# Patient Record
Sex: FEMALE | Race: BLACK OR AFRICAN AMERICAN | NOT HISPANIC OR LATINO | Employment: FULL TIME | URBAN - METROPOLITAN AREA
[De-identification: names, ages, dates, MRNs, and addresses within clinical notes are randomized per-mention and may not be internally consistent; named-entity substitution may affect disease eponyms.]

---

## 2018-05-16 ENCOUNTER — HOSPITAL ENCOUNTER (EMERGENCY)
Facility: HOSPITAL | Age: 18
Discharge: HOME | End: 2018-05-16
Attending: EMERGENCY MEDICINE
Payer: MEDICAID

## 2018-05-16 VITALS
DIASTOLIC BLOOD PRESSURE: 91 MMHG | SYSTOLIC BLOOD PRESSURE: 135 MMHG | TEMPERATURE: 98 F | BODY MASS INDEX: 41.81 KG/M2 | WEIGHT: 236 LBS | OXYGEN SATURATION: 99 % | RESPIRATION RATE: 16 BRPM | HEART RATE: 80 BPM

## 2018-05-16 DIAGNOSIS — N89.8 VAGINAL DISCHARGE: ICD-10-CM

## 2018-05-16 DIAGNOSIS — R10.2 PELVIC PAIN: Primary | ICD-10-CM

## 2018-05-16 LAB
B-HCG UR QL: NEGATIVE
BILIRUBIN, POC UA: NEGATIVE
BLOOD, POC UA: ABNORMAL
CLARITY, POC UA: CLEAR
COLOR, POC UA: YELLOW
CTP QC/QA: YES
GLUCOSE, POC UA: NEGATIVE
KETONES, POC UA: NEGATIVE
LEUKOCYTE EST, POC UA: ABNORMAL
NITRITE, POC UA: NEGATIVE
PH UR STRIP: 5.5 [PH]
PROTEIN, POC UA: NEGATIVE
SPECIFIC GRAVITY, POC UA: >=1.03
UROBILINOGEN, POC UA: 0.2 E.U./DL

## 2018-05-16 PROCEDURE — 81003 URINALYSIS AUTO W/O SCOPE: CPT

## 2018-05-16 PROCEDURE — 87088 URINE BACTERIA CULTURE: CPT

## 2018-05-16 PROCEDURE — 87510 GARDNER VAG DNA DIR PROBE: CPT

## 2018-05-16 PROCEDURE — 87086 URINE CULTURE/COLONY COUNT: CPT

## 2018-05-16 PROCEDURE — 99284 EMERGENCY DEPT VISIT MOD MDM: CPT | Mod: 25

## 2018-05-16 PROCEDURE — 81025 URINE PREGNANCY TEST: CPT | Performed by: EMERGENCY MEDICINE

## 2018-05-16 PROCEDURE — 87480 CANDIDA DNA DIR PROBE: CPT

## 2018-05-16 PROCEDURE — 87147 CULTURE TYPE IMMUNOLOGIC: CPT

## 2018-05-16 PROCEDURE — 25000003 PHARM REV CODE 250: Performed by: NURSE PRACTITIONER

## 2018-05-16 PROCEDURE — 96372 THER/PROPH/DIAG INJ SC/IM: CPT

## 2018-05-16 PROCEDURE — 63600175 PHARM REV CODE 636 W HCPCS: Performed by: NURSE PRACTITIONER

## 2018-05-16 PROCEDURE — 87491 CHLMYD TRACH DNA AMP PROBE: CPT

## 2018-05-16 RX ORDER — AZITHROMYCIN 250 MG/1
1000 TABLET, FILM COATED ORAL
Status: COMPLETED | OUTPATIENT
Start: 2018-05-16 | End: 2018-05-16

## 2018-05-16 RX ORDER — CEFTRIAXONE 250 MG/1
250 INJECTION, POWDER, FOR SOLUTION INTRAMUSCULAR; INTRAVENOUS
Status: COMPLETED | OUTPATIENT
Start: 2018-05-16 | End: 2018-05-16

## 2018-05-16 RX ORDER — METRONIDAZOLE 500 MG/1
500 TABLET ORAL EVERY 12 HOURS
Qty: 21 TABLET | Refills: 0 | Status: SHIPPED | OUTPATIENT
Start: 2018-05-16 | End: 2018-05-23

## 2018-05-16 RX ADMIN — AZITHROMYCIN 1000 MG: 250 TABLET, FILM COATED ORAL at 12:05

## 2018-05-16 RX ADMIN — CEFTRIAXONE SODIUM 250 MG: 250 INJECTION, POWDER, FOR SOLUTION INTRAMUSCULAR; INTRAVENOUS at 12:05

## 2018-05-16 NOTE — ED PROVIDER NOTES
"Encounter Date: 5/16/2018       History     Chief Complaint   Patient presents with    Abdominal Pain     Pt states," My stomach has been hurting for two days and I have a headache."     The history is provided by the patient. No  was used.   Female  Problem   Primary symptoms include discharge, pelvic pain.    Primary symptoms include no fever, no dyspareunia, no genital lesions, no genital pain, no genital rash, no genital itching, no genital odor, no dysuria, and no vaginal bleeding.   This is a new problem. The current episode started several weeks ago. The problem occurs constantly. The problem has been waxing and waning. The symptoms occur spontaneously. The discharge was white. Pertinent negatives include no anorexia, no diaphoresis, no abdominal swelling, no constipation, no diarrhea, no nausea, no vomiting, no frequency, no light-headedness and no dizziness. She has tried nothing for the symptoms. Sexual activity: sexually active. There is a concern regarding sexually transmitted diseases. She uses nothing for contraception.     Review of patient's allergies indicates:  No Known Allergies  Past Medical History:   Diagnosis Date    Obesity      History reviewed. No pertinent surgical history.  Family History   Problem Relation Age of Onset    Cancer Neg Hx      Social History   Substance Use Topics    Smoking status: Never Smoker    Smokeless tobacco: Never Used    Alcohol use No     Review of Systems   Constitutional: Negative.  Negative for appetite change, diaphoresis and fever.   HENT: Negative.  Negative for congestion, dental problem, ear discharge, hearing loss, mouth sores, rhinorrhea and trouble swallowing.    Eyes: Negative.  Negative for pain and discharge.   Respiratory: Negative.  Negative for shortness of breath.    Cardiovascular: Negative.  Negative for chest pain.   Gastrointestinal: Negative for abdominal distention, anorexia, constipation, diarrhea, nausea, " rectal pain and vomiting.   Endocrine: Negative.    Genitourinary: Positive for pelvic pain and vaginal discharge. Negative for dyspareunia, dysuria, frequency, hematuria, vaginal bleeding and vaginal pain.   Musculoskeletal: Negative for back pain and neck pain.   Skin: Negative.  Negative for rash.   Allergic/Immunologic: Negative.    Neurological: Negative.  Negative for dizziness, facial asymmetry, speech difficulty and light-headedness.   Hematological: Negative.    Psychiatric/Behavioral: Negative.  Negative for agitation, dysphoric mood and sleep disturbance.   All other systems reviewed and are negative.      Physical Exam     Initial Vitals [05/16/18 1123]   BP Pulse Resp Temp SpO2   (!) 135/91 80 16 98 °F (36.7 °C) 99 %      MAP       105.67         Physical Exam    Nursing note and vitals reviewed.  Constitutional: She appears well-developed and well-nourished. She is not diaphoretic.  Non-toxic appearance. She does not appear ill. No distress.   HENT:   Head: Normocephalic and atraumatic.   Eyes: Conjunctivae are normal. Right eye exhibits no discharge. Left eye exhibits no discharge.   Neck: Normal range of motion.   Cardiovascular: Normal rate, regular rhythm, normal heart sounds and intact distal pulses. Exam reveals no gallop and no friction rub.    No murmur heard.  Pulmonary/Chest: Breath sounds normal. No respiratory distress. She has no wheezes. She has no rhonchi. She has no rales. She exhibits no tenderness.   Abdominal: Soft. Bowel sounds are normal. She exhibits no distension and no mass. There is no tenderness. There is no rebound and no guarding.   Genitourinary: Uterus normal. Pelvic exam was performed with patient supine. There is no rash, tenderness, lesion or injury on the right labia. There is no rash, tenderness, lesion or injury on the left labia. Cervix exhibits discharge. Cervix exhibits no motion tenderness and no friability. Right adnexum displays no mass and no tenderness. Left  adnexum displays no mass and no tenderness. No erythema, tenderness or bleeding in the vagina. No foreign body in the vagina. No signs of injury around the vagina. Vaginal discharge (mucous-like, yellowish-white) found.   Musculoskeletal: Normal range of motion.   Neurological: She is alert and oriented to person, place, and time.   Skin: Skin is warm and dry. Capillary refill takes less than 2 seconds. No rash noted.   Psychiatric: She has a normal mood and affect. Her behavior is normal. Judgment and thought content normal.         ED Course   Procedures  Labs Reviewed   POCT URINALYSIS W/O SCOPE - Abnormal; Notable for the following:        Result Value    Glucose, UA Negative (*)     Bilirubin, UA Negative (*)     Ketones, UA Negative (*)     Spec Grav UA >=1.030 (*)     Blood, UA Trace-lysed (*)     Protein, UA Negative (*)     Nitrite, UA Negative (*)     Leukocytes, UA 1+ (*)     All other components within normal limits   C. TRACHOMATIS/N. GONORRHOEAE BY AMP DNA   VAGINOSIS SCREEN BY DNA PROBE   CULTURE, URINE   POCT URINE PREGNANCY   POCT URINALYSIS W/O SCOPE             Medical Decision Making:   Initial Assessment:   Vaginal discharge, pelvic pain  Differential Diagnosis:   STD, adnexal tenderness  Clinical Tests:   Lab Tests: Ordered and Reviewed  The following lab test(s) were unremarkable: Urinalysis       <> Summary of Lab: GC culture and urine culture and vaginal screen pending  ED Management:  Rocephin IM and azithromycin p.o. given in the ER patient discharged home on Flagyl.  Will call patient with results of cultures.  Patient is instructed to refrain from sex x 1 week and follow up with her primary care provider/OBGYN tomorrow.  Patient is instructed also to use condoms when having sex and return to the ER as needed if symptoms worsen or fail to improve.  Patient verbalized understanding of discharge instructions and treatment plan.              Attending Attestation:     Physician Attestation  Statement for NP/PA:   I reviewed the chart but I did not personally examine the patient. The face to face encounter was performed by the NP/PA.                     Clinical Impression:   The primary encounter diagnosis was Pelvic pain. A diagnosis of Vaginal discharge was also pertinent to this visit.                           Toussaint Battley III, MARIA GUADALUPE  05/16/18 7112       Lizbet Colunga MD  05/16/18 3185

## 2018-05-17 LAB
CANDIDA RRNA VAG QL PROBE: NEGATIVE
G VAGINALIS RRNA GENITAL QL PROBE: NEGATIVE
T VAGINALIS RRNA GENITAL QL PROBE: NEGATIVE

## 2018-05-18 LAB
BACTERIA UR CULT: NORMAL
C TRACH DNA SPEC QL NAA+PROBE: NOT DETECTED
N GONORRHOEA DNA SPEC QL NAA+PROBE: NOT DETECTED

## 2018-10-05 ENCOUNTER — HOSPITAL ENCOUNTER (EMERGENCY)
Facility: HOSPITAL | Age: 18
Discharge: HOME | End: 2018-10-06
Attending: EMERGENCY MEDICINE
Payer: MEDICAID

## 2018-10-05 VITALS
WEIGHT: 246 LBS | TEMPERATURE: 99 F | RESPIRATION RATE: 20 BRPM | SYSTOLIC BLOOD PRESSURE: 125 MMHG | OXYGEN SATURATION: 100 % | DIASTOLIC BLOOD PRESSURE: 88 MMHG | HEART RATE: 82 BPM | BODY MASS INDEX: 43.59 KG/M2 | HEIGHT: 63 IN

## 2018-10-05 DIAGNOSIS — N94.6 DYSMENORRHEA: Primary | ICD-10-CM

## 2018-10-05 DIAGNOSIS — N93.8 DYSFUNCTIONAL UTERINE BLEEDING: ICD-10-CM

## 2018-10-05 LAB
B-HCG UR QL: NEGATIVE
BILIRUBIN, POC UA: NEGATIVE
BLOOD, POC UA: ABNORMAL
CLARITY, POC UA: CLEAR
COLOR, POC UA: YELLOW
CTP QC/QA: YES
GLUCOSE, POC UA: NEGATIVE
KETONES, POC UA: NEGATIVE
LEUKOCYTE EST, POC UA: ABNORMAL
NITRITE, POC UA: NEGATIVE
PH UR STRIP: 6 [PH]
PROTEIN, POC UA: ABNORMAL
SPECIFIC GRAVITY, POC UA: 1.02
UROBILINOGEN, POC UA: 0.2 E.U./DL

## 2018-10-05 PROCEDURE — 81025 URINE PREGNANCY TEST: CPT | Performed by: NURSE PRACTITIONER

## 2018-10-05 PROCEDURE — 81003 URINALYSIS AUTO W/O SCOPE: CPT

## 2018-10-05 PROCEDURE — 87491 CHLMYD TRACH DNA AMP PROBE: CPT

## 2018-10-05 PROCEDURE — 99283 EMERGENCY DEPT VISIT LOW MDM: CPT | Mod: 25

## 2018-10-05 PROCEDURE — 87660 TRICHOMONAS VAGIN DIR PROBE: CPT

## 2018-10-05 RX ORDER — IBUPROFEN 600 MG/1
600 TABLET ORAL EVERY 6 HOURS PRN
Qty: 20 TABLET | Refills: 0 | Status: SHIPPED | OUTPATIENT
Start: 2018-10-05 | End: 2019-12-07 | Stop reason: SDUPTHER

## 2018-10-06 NOTE — ED PROVIDER NOTES
Encounter Date: 10/5/2018       History     Chief Complaint   Patient presents with    Pelvic Pain     Patient stated being on menstrual cycle with blood clots the size of cotton balls x 3 days. Patient also stated starting birth control x 1 month. Patient stated her menses started on August 7, 2018. Patient stated seeing her GYN physician on september 05, 2018. Patient no improvment .     The history is provided by the patient. No  was used.   Vaginal Bleeding   This is a recurrent problem. The current episode started more than 1 week ago. Episode frequency: Intermittently. Progression since onset: Worsening over the past 3 days. Associated symptoms include abdominal pain (Pelvic pain). Pertinent negatives include no chest pain, no headaches and no shortness of breath. Nothing aggravates the symptoms. Nothing relieves the symptoms. Treatments tried: Patient has seen her OBGYN for recurrent irregular menses and is currently being treated with oral contraceptives.  Patient denies having any sex since July of 2018.     Review of patient's allergies indicates:  No Known Allergies  Past Medical History:   Diagnosis Date    Obesity      History reviewed. No pertinent surgical history.  Family History   Problem Relation Age of Onset    Cancer Neg Hx      Social History     Tobacco Use    Smoking status: Never Smoker    Smokeless tobacco: Never Used   Substance Use Topics    Alcohol use: Not on file    Drug use: Yes     Frequency: 1.0 times per week     Types: Marijuana     Review of Systems   Constitutional: Negative.  Negative for appetite change and fever.   HENT: Negative.  Negative for congestion, dental problem, ear discharge, hearing loss, mouth sores, rhinorrhea and trouble swallowing.    Eyes: Negative.  Negative for pain and discharge.   Respiratory: Negative.  Negative for shortness of breath.    Cardiovascular: Negative.  Negative for chest pain.   Gastrointestinal: Positive for  abdominal pain (Pelvic pain). Negative for abdominal distention, constipation, diarrhea, nausea, rectal pain and vomiting.   Endocrine: Negative.    Genitourinary: Positive for vaginal bleeding. Negative for dyspareunia, dysuria, hematuria, vaginal discharge and vaginal pain.   Musculoskeletal: Negative for back pain and neck pain.   Skin: Negative.  Negative for rash.   Allergic/Immunologic: Negative.    Neurological: Negative.  Negative for facial asymmetry, speech difficulty, light-headedness and headaches.   Hematological: Negative.    Psychiatric/Behavioral: Negative.  Negative for agitation, dysphoric mood and sleep disturbance.   All other systems reviewed and are negative.      Physical Exam     Initial Vitals [10/05/18 2044]   BP Pulse Resp Temp SpO2   125/88 82 20 99.3 °F (37.4 °C) 100 %      MAP       --         Physical Exam    Nursing note and vitals reviewed.  Constitutional: She appears well-developed and well-nourished. She is not diaphoretic.  Non-toxic appearance. She does not appear ill. No distress.   HENT:   Head: Normocephalic and atraumatic.   Eyes: Conjunctivae are normal. Right eye exhibits no discharge. Left eye exhibits no discharge.   Neck: Normal range of motion.   Cardiovascular: Normal rate, regular rhythm, normal heart sounds and intact distal pulses. Exam reveals no gallop and no friction rub.    No murmur heard.  Pulmonary/Chest: Breath sounds normal. No respiratory distress. She has no wheezes. She has no rhonchi. She has no rales. She exhibits no tenderness.   Abdominal: Soft. Normal appearance and bowel sounds are normal. She exhibits no shifting dullness, no distension, no pulsatile liver, no fluid wave, no abdominal bruit, no ascites, no pulsatile midline mass and no mass. There is no hepatosplenomegaly. There is no tenderness. There is no rigidity, no rebound, no guarding, no CVA tenderness, no tenderness at McBurney's point and negative Jane's sign. No hernia.    Genitourinary: Pelvic exam was performed with patient supine. There is no rash, tenderness, lesion or injury on the right labia. There is no rash, tenderness, lesion or injury on the left labia. Cervix exhibits no motion tenderness, no discharge and no friability. Right adnexum displays no mass, no tenderness and no fullness. Left adnexum displays no mass, no tenderness and no fullness. There is bleeding in the vagina. No erythema or tenderness in the vagina. No foreign body in the vagina. No signs of injury around the vagina. No vaginal discharge found.       Musculoskeletal: Normal range of motion.   Neurological: She is alert and oriented to person, place, and time.   Skin: Skin is warm and dry. Capillary refill takes less than 2 seconds. No rash noted.   Psychiatric: She has a normal mood and affect. Her behavior is normal. Judgment and thought content normal.         ED Course   Procedures  Labs Reviewed   POCT URINALYSIS W/O SCOPE - Abnormal; Notable for the following components:       Result Value    Glucose, UA Negative (*)     Bilirubin, UA Negative (*)     Ketones, UA Negative (*)     Blood, UA 3+ (*)     Protein, UA 2+ (*)     Nitrite, UA Negative (*)     Leukocytes, UA Trace (*)     All other components within normal limits   VAGINOSIS SCREEN BY DNA PROBE   C. TRACHOMATIS/N. GONORRHOEAE BY AMP DNA   POCT URINE PREGNANCY   POCT URINALYSIS W/O SCOPE          Imaging Results    None          Medical Decision Making:   Initial Assessment:   Dysmenorrhea and dysfunctional uterine bleeding  Differential Diagnosis:   Vaginal discharge, UTI  Clinical Tests:   Lab Tests: Ordered and Reviewed  The following lab test(s) were unremarkable: Urinalysis       <> Summary of Lab: GC pending, wet prep pending  ED Management:  The patient will be discharged home on Motrin with instructions to follow up with her OBGYN in 3 days and refrain from sexual activity until cleared by OB gyn as well as return to the ER as needed  if symptoms worsen or fail to improve.  Patient verbalized understanding of discharge instructions and treatment plan.                      Clinical Impression:   The primary encounter diagnosis was Dysmenorrhea. A diagnosis of Dysfunctional uterine bleeding was also pertinent to this visit.                             Toussaint Battley III, MARIA GUADALUPE  10/05/18 2122

## 2018-10-07 LAB
C TRACH DNA SPEC QL NAA+PROBE: DETECTED
CANDIDA RRNA VAG QL PROBE: NEGATIVE
G VAGINALIS RRNA GENITAL QL PROBE: POSITIVE
N GONORRHOEA DNA SPEC QL NAA+PROBE: NOT DETECTED
T VAGINALIS RRNA GENITAL QL PROBE: NEGATIVE

## 2018-10-12 ENCOUNTER — TELEPHONE (OUTPATIENT)
Dept: EMERGENCY MEDICINE | Facility: HOSPITAL | Age: 18
End: 2018-10-12

## 2018-10-12 NOTE — TELEPHONE ENCOUNTER
----- Message from Ana Hernandez MD sent at 10/8/2018  5:25 AM CDT -----  Please call the patient regarding her abnormal result. Please call in azithromycin 1gm po x1 dose and metrogel bid x7d. thanks

## 2019-01-06 ENCOUNTER — HOSPITAL ENCOUNTER (EMERGENCY)
Facility: HOSPITAL | Age: 19
Discharge: HOME | End: 2019-01-06
Attending: EMERGENCY MEDICINE
Payer: MEDICAID

## 2019-01-06 VITALS
SYSTOLIC BLOOD PRESSURE: 128 MMHG | RESPIRATION RATE: 20 BRPM | HEART RATE: 78 BPM | TEMPERATURE: 98 F | WEIGHT: 246 LBS | OXYGEN SATURATION: 100 % | HEIGHT: 63 IN | DIASTOLIC BLOOD PRESSURE: 72 MMHG | BODY MASS INDEX: 43.59 KG/M2

## 2019-01-06 DIAGNOSIS — N89.8 VAGINAL DISCHARGE: Primary | ICD-10-CM

## 2019-01-06 LAB
B-HCG UR QL: NEGATIVE
BILIRUBIN, POC UA: NEGATIVE
BLOOD, POC UA: NEGATIVE
CLARITY, POC UA: CLEAR
COLOR, POC UA: YELLOW
CTP QC/QA: YES
CTP QC/QA: YES
GLUCOSE, POC UA: NEGATIVE
KETONES, POC UA: ABNORMAL
LEUKOCYTE EST, POC UA: NEGATIVE
NITRITE, POC UA: NEGATIVE
PH UR STRIP: 8.5 [PH]
PROTEIN, POC UA: ABNORMAL
S PYO RRNA THROAT QL PROBE: NEGATIVE
SPECIFIC GRAVITY, POC UA: 1.01
UROBILINOGEN, POC UA: 1 E.U./DL

## 2019-01-06 PROCEDURE — 87480 CANDIDA DNA DIR PROBE: CPT

## 2019-01-06 PROCEDURE — 25000003 PHARM REV CODE 250: Mod: ER | Performed by: NURSE PRACTITIONER

## 2019-01-06 PROCEDURE — 63600175 PHARM REV CODE 636 W HCPCS: Mod: ER | Performed by: NURSE PRACTITIONER

## 2019-01-06 PROCEDURE — 96372 THER/PROPH/DIAG INJ SC/IM: CPT | Mod: ER

## 2019-01-06 PROCEDURE — 87510 GARDNER VAG DNA DIR PROBE: CPT

## 2019-01-06 PROCEDURE — 87491 CHLMYD TRACH DNA AMP PROBE: CPT

## 2019-01-06 PROCEDURE — 87081 CULTURE SCREEN ONLY: CPT

## 2019-01-06 PROCEDURE — 81025 URINE PREGNANCY TEST: CPT | Mod: ER | Performed by: EMERGENCY MEDICINE

## 2019-01-06 PROCEDURE — 81003 URINALYSIS AUTO W/O SCOPE: CPT | Mod: ER

## 2019-01-06 PROCEDURE — 99284 EMERGENCY DEPT VISIT MOD MDM: CPT | Mod: 25,ER

## 2019-01-06 PROCEDURE — 87880 STREP A ASSAY W/OPTIC: CPT | Mod: ER

## 2019-01-06 RX ORDER — AZITHROMYCIN 250 MG/1
1000 TABLET, FILM COATED ORAL ONCE
Status: COMPLETED | OUTPATIENT
Start: 2019-01-06 | End: 2019-01-06

## 2019-01-06 RX ORDER — METRONIDAZOLE 500 MG/1
500 TABLET ORAL EVERY 12 HOURS
Qty: 14 TABLET | Refills: 0 | Status: SHIPPED | OUTPATIENT
Start: 2019-01-06 | End: 2019-01-13

## 2019-01-06 RX ORDER — CEFTRIAXONE 250 MG/1
250 INJECTION, POWDER, FOR SOLUTION INTRAMUSCULAR; INTRAVENOUS
Status: COMPLETED | OUTPATIENT
Start: 2019-01-06 | End: 2019-01-06

## 2019-01-06 RX ADMIN — AZITHROMYCIN 1000 MG: 250 TABLET, FILM COATED ORAL at 07:01

## 2019-01-06 RX ADMIN — CEFTRIAXONE SODIUM 250 MG: 250 INJECTION, POWDER, FOR SOLUTION INTRAMUSCULAR; INTRAVENOUS at 07:01

## 2019-01-07 NOTE — ED PROVIDER NOTES
Encounter Date: 1/6/2019       History     Chief Complaint   Patient presents with    Sore Throat     pt reports sore throat since yesterday. Also c/o vaginal itching and brownish discharge for 1 week    Female  Problem     A 18-year-old female who presents to the ED with complaints of a sore throat since yesterday.  Patient reports a vaginal itching a brownish discharge x1 week.  Patient denies dysuria, urinary frequency or hematuria.      The history is provided by the patient.   Sore Throat    This is a new problem. The current episode started yesterday. The problem has been gradually worsening. There has been no fever. The fever has been present for less than 1 day. The pain is at a severity of 5/10. Pertinent negatives include no abdominal pain or shortness of breath. She has tried nothing for the symptoms.   Female  Problem   Primary symptoms include discharge.    Primary symptoms include no fever.   The current episode started several days ago. The problem has been gradually worsening. The discharge was brown. Pertinent negatives include no abdominal pain and no nausea. She has tried nothing for the symptoms. Sexual activity: sexually active. There is a concern regarding sexually transmitted diseases.     Review of patient's allergies indicates:  No Known Allergies  Past Medical History:   Diagnosis Date    Obesity      History reviewed. No pertinent surgical history.  Family History   Problem Relation Age of Onset    Cancer Neg Hx      Social History     Tobacco Use    Smoking status: Never Smoker    Smokeless tobacco: Never Used   Substance Use Topics    Alcohol use: Not on file    Drug use: Yes     Frequency: 1.0 times per week     Types: Marijuana     Review of Systems   Constitutional: Negative.  Negative for fever.   HENT: Positive for sore throat.    Eyes: Negative.    Respiratory: Negative.  Negative for shortness of breath.    Cardiovascular: Negative.  Negative for chest pain.    Gastrointestinal: Negative.  Negative for abdominal pain and nausea.   Endocrine: Negative.    Genitourinary: Positive for vaginal discharge.   Musculoskeletal: Negative.    Skin: Negative.    Allergic/Immunologic: Negative.    Neurological: Negative.    Hematological: Negative.    All other systems reviewed and are negative.      Physical Exam     Initial Vitals [01/06/19 1622]   BP Pulse Resp Temp SpO2   127/61 92 18 98 °F (36.7 °C) 99 %      MAP       --         Physical Exam    Nursing note and vitals reviewed.  Constitutional: Vital signs are normal. She appears well-developed. She is cooperative. She does not appear ill.   HENT:   Right Ear: External ear normal.   Left Ear: External ear normal.   Nose: Nose normal.   Mouth/Throat: Oropharynx is clear and moist.   Eyes: Conjunctivae and lids are normal. Pupils are equal, round, and reactive to light.   Neck: Normal range of motion. Neck supple.   Cardiovascular: Normal rate, regular rhythm, S1 normal, S2 normal and normal heart sounds.   Pulmonary/Chest: Effort normal and breath sounds normal.   Abdominal: Soft. Normal appearance and bowel sounds are normal. There is no tenderness.   Genitourinary: Uterus normal. There is no rash or tenderness on the right labia. There is no rash or tenderness on the left labia. Cervix exhibits motion tenderness and discharge (thich white discharge). Right adnexum displays no mass, no tenderness and no fullness. Left adnexum displays no mass, no tenderness and no fullness. No tenderness or bleeding in the vagina. No signs of injury around the vagina. Vaginal discharge (thick white discharge) found.   Musculoskeletal: Normal range of motion.   From of all extremities   Neurological: She is alert and oriented to person, place, and time.   Skin: Skin is warm, dry and intact.   Psychiatric: She has a normal mood and affect. Her speech is normal. Cognition and memory are normal.         ED Course   Procedures  Labs Reviewed   POCT  URINALYSIS W/O SCOPE - Abnormal; Notable for the following components:       Result Value    Glucose, UA Negative (*)     Bilirubin, UA Negative (*)     Ketones, UA Trace (*)     Blood, UA Negative (*)     Protein, UA 2+ (*)     Nitrite, UA Negative (*)     Leukocytes, UA Negative (*)     All other components within normal limits   CULTURE, STREP A,  THROAT   POCT RAPID STREP A   POCT URINE PREGNANCY   POCT URINALYSIS W/O SCOPE          Imaging Results    None          Medical Decision Making:   Initial Assessment:   A 18-year-old female who presents to the ED with complaints of a sore throat since yesterday.  Patient reports a vaginal itching a brownish discharge x1 week.  Patient denies dysuria, urinary frequency or hematuria.    Differential Diagnosis:   Strep pharyngitis, viral pharyngitis, urinary tract infection, STD exposure  Clinical Tests:   Lab Tests: Ordered and Reviewed  ED Management:  Strep swab negative.   exam with thick white vaginal discharge.  Medicated with ceftriaxone 250 mg IM and azithromycin 1 g p.o..  Discharge home with Flagyl 500 mg b.i.d. for 7 days.  Follow-up with PCP in 1-2 days.  Return ED for worsening of symptoms. Patient educated on having safe sex.  Patient verbalized understanding.                   ED Course as of Jan 06 1814   Sun Jan 06, 2019   1620 A 18-year-old female presents to the ED with sore throat for few days.  Patient also reports vaginal itching  [TA]      ED Course User Index  [TA] MARIA GUADALUPE Bell     Clinical Impression:   The encounter diagnosis was Vaginal discharge.                             MARIA GUADALUPE Bell  01/06/19 [id number]

## 2019-01-08 LAB
BACTERIA THROAT CULT: NORMAL
C TRACH DNA SPEC QL NAA+PROBE: NOT DETECTED
N GONORRHOEA DNA SPEC QL NAA+PROBE: NOT DETECTED

## 2019-06-22 ENCOUNTER — HOSPITAL ENCOUNTER (EMERGENCY)
Facility: HOSPITAL | Age: 19
Discharge: HOME | End: 2019-06-23
Attending: EMERGENCY MEDICINE
Payer: MEDICAID

## 2019-06-22 DIAGNOSIS — R30.0 DYSURIA: Primary | ICD-10-CM

## 2019-06-22 PROCEDURE — 99282 EMERGENCY DEPT VISIT SF MDM: CPT | Mod: ER

## 2019-06-22 PROCEDURE — 81025 URINE PREGNANCY TEST: CPT | Mod: ER | Performed by: EMERGENCY MEDICINE

## 2019-06-22 PROCEDURE — 81003 URINALYSIS AUTO W/O SCOPE: CPT | Mod: ER

## 2019-06-23 VITALS
RESPIRATION RATE: 20 BRPM | SYSTOLIC BLOOD PRESSURE: 117 MMHG | DIASTOLIC BLOOD PRESSURE: 68 MMHG | WEIGHT: 246 LBS | BODY MASS INDEX: 43.59 KG/M2 | OXYGEN SATURATION: 100 % | HEIGHT: 63 IN | HEART RATE: 84 BPM | TEMPERATURE: 98 F

## 2019-06-23 RX ORDER — PHENAZOPYRIDINE HYDROCHLORIDE 200 MG/1
200 TABLET, FILM COATED ORAL 3 TIMES DAILY
Qty: 6 TABLET | Refills: 0 | Status: SHIPPED | OUTPATIENT
Start: 2019-06-23 | End: 2019-07-03

## 2019-06-23 NOTE — ED PROVIDER NOTES
Encounter Date: 6/22/2019    SCRIBE #1 NOTE: I, Jelena Farias, am scribing for, and in the presence of,  Dr. Patiño. I have scribed the following portions of the note - Other sections scribed: HPI, ROS, PE.       History     Chief Complaint   Patient presents with    Urinary Frequency     pt c/o urinary frequency and pressure x 1 week     18 y.o female presents with increased urinary frequency for 1 week. She thinks her bladder is not fully empting.  She denies fever, flank pain, dysuria, or back pain.     The history is provided by the patient. No  was used.     Review of patient's allergies indicates:  No Known Allergies  Past Medical History:   Diagnosis Date    Obesity      History reviewed. No pertinent surgical history.  Family History   Problem Relation Age of Onset    Cancer Neg Hx      Social History     Tobacco Use    Smoking status: Never Smoker    Smokeless tobacco: Never Used   Substance Use Topics    Alcohol use: Yes    Drug use: Yes     Frequency: 1.0 times per week     Types: Marijuana     Review of Systems   Constitutional: Negative for fever.   HENT: Negative for sore throat.    Respiratory: Negative for shortness of breath.    Cardiovascular: Negative for chest pain.   Gastrointestinal: Negative for nausea.   Genitourinary: Positive for frequency. Negative for dysuria and flank pain.   Musculoskeletal: Negative for back pain.   Skin: Negative for rash.   Neurological: Negative for weakness.   Hematological: Does not bruise/bleed easily.   All other systems reviewed and are negative.      Physical Exam     Initial Vitals [06/22/19 2344]   BP Pulse Resp Temp SpO2   107/71 88 18 98.3 °F (36.8 °C) 99 %      MAP       --         Physical Exam    Nursing note and vitals reviewed.  Constitutional: She appears well-developed and well-nourished. She is not diaphoretic. No distress.   HENT:   Head: Normocephalic and atraumatic.   Right Ear: External ear normal.   Left Ear:  External ear normal.   Nose: Nose normal.   Eyes: Conjunctivae are normal.   Neck: Normal range of motion. Neck supple.   Cardiovascular: Normal rate and intact distal pulses.   Pulmonary/Chest: Effort normal. No respiratory distress.   Abdominal: Soft. There is no tenderness.   Musculoskeletal: Normal range of motion.   Neurological: She is alert and oriented to person, place, and time.   Skin: Skin is warm and dry. Capillary refill takes less than 2 seconds.   Psychiatric: She has a normal mood and affect. Her behavior is normal.         ED Course   Procedures  Labs Reviewed   POCT URINALYSIS W/O SCOPE - Abnormal; Notable for the following components:       Result Value    Glucose, UA Negative (*)     Bilirubin, UA Negative (*)     Ketones, UA Negative (*)     Spec Grav UA >=1.030 (*)     Blood, UA Trace-intact (*)     Protein, UA Negative (*)     Nitrite, UA Negative (*)     Leukocytes, UA Trace (*)     All other components within normal limits   POCT URINE PREGNANCY   POCT URINALYSIS W/O SCOPE          Imaging Results    None          Medical Decision Making:   Clinical Tests:   Lab Tests: Reviewed and Ordered           Results for orders placed or performed during the hospital encounter of 06/22/19   POCT urine pregnancy   Result Value Ref Range    POC Preg Test, Ur Negative Negative     Acceptable Yes    POCT URINALYSIS W/O SCOPE   Result Value Ref Range    Glucose, UA Negative (NG)     Bilirubin, UA Negative (NG)     Ketones, UA Negative (NG)     Spec Grav UA >=1.030 (>)     Blood, UA Trace-intact (A)     PH, UA 5.5     Protein, UA Negative (NG)     Urobilinogen, UA 0.2 E.U./dL    Nitrite, UA Negative (NG)     Leukocytes, UA Trace (A)     Color, UA Yellow     Clarity, UA Clear          Scribe Attestation:   Scribe #1: I performed the above scribed service and the documentation accurately describes the services I performed. I attest to the accuracy of the note.    This document was produced by a  scribe under my direction and in my presence. I agree with the content of the note and have made any necessary edits.     Toni Patiño MD    06/23/2019 12:15 AM           Clinical Impression:     1. Dysuria                                   Toni Patiño MD  06/23/19 0015

## 2019-07-02 ENCOUNTER — OFFICE VISIT (OUTPATIENT)
Dept: PEDIATRICS | Facility: CLINIC | Age: 19
End: 2019-07-02
Payer: MEDICAID

## 2019-07-02 VITALS
WEIGHT: 251.63 LBS | SYSTOLIC BLOOD PRESSURE: 113 MMHG | BODY MASS INDEX: 44.59 KG/M2 | TEMPERATURE: 99 F | HEIGHT: 63 IN | OXYGEN SATURATION: 96 % | DIASTOLIC BLOOD PRESSURE: 61 MMHG | HEART RATE: 69 BPM

## 2019-07-02 DIAGNOSIS — R35.0 URINARY FREQUENCY: Primary | ICD-10-CM

## 2019-07-02 DIAGNOSIS — R33.9 INCOMPLETE BLADDER EMPTYING: ICD-10-CM

## 2019-07-02 DIAGNOSIS — N89.8 VAGINAL DISCHARGE: ICD-10-CM

## 2019-07-02 LAB
B-HCG UR QL: NEGATIVE
BACTERIAL VAGINOSIS DNA: NEGATIVE
BILIRUB SERPL-MCNC: NORMAL MG/DL
BLOOD, POC UA: NORMAL
CANDIDA GLABRATA DNA: NEGATIVE
CANDIDA KRUSEI DNA: NEGATIVE
CANDIDA RRNA VAG QL PROBE: NEGATIVE
CTP QC/QA: YES
GLUCOSE UR QL STRIP: NORMAL
KETONES UR QL STRIP: NORMAL
LEUKOCYTE ESTERASE URINE, POC: NORMAL
NITRITE, POC UA: NORMAL
PH, POC UA: 5
PROTEIN, POC: NORMAL
SPECIFIC GRAVITY, POC UA: 1.02
T VAGINALIS RRNA GENITAL QL PROBE: NEGATIVE
UROBILINOGEN, POC UA: NORMAL

## 2019-07-02 PROCEDURE — 99214 OFFICE O/P EST MOD 30 MIN: CPT | Mod: 25,S$GLB,, | Performed by: PEDIATRICS

## 2019-07-02 PROCEDURE — 87086 URINE CULTURE/COLONY COUNT: CPT

## 2019-07-02 PROCEDURE — 81025 URINE PREGNANCY TEST: CPT | Mod: S$GLB,,, | Performed by: PEDIATRICS

## 2019-07-02 PROCEDURE — 81025 POCT URINE PREGNANCY: ICD-10-PCS | Mod: S$GLB,,, | Performed by: PEDIATRICS

## 2019-07-02 PROCEDURE — 81000 URINALYSIS NONAUTO W/SCOPE: CPT | Mod: S$GLB,,, | Performed by: PEDIATRICS

## 2019-07-02 PROCEDURE — 81000 POCT URINALYSIS: ICD-10-PCS | Mod: S$GLB,,, | Performed by: PEDIATRICS

## 2019-07-02 PROCEDURE — 87510 GARDNER VAG DNA DIR PROBE: CPT

## 2019-07-02 PROCEDURE — 87088 URINE BACTERIA CULTURE: CPT

## 2019-07-02 PROCEDURE — 87480 CANDIDA DNA DIR PROBE: CPT

## 2019-07-02 PROCEDURE — 87077 CULTURE AEROBIC IDENTIFY: CPT

## 2019-07-02 PROCEDURE — 87491 CHLMYD TRACH DNA AMP PROBE: CPT

## 2019-07-02 PROCEDURE — 87186 SC STD MICRODIL/AGAR DIL: CPT

## 2019-07-02 PROCEDURE — 99214 PR OFFICE/OUTPT VISIT, EST, LEVL IV, 30-39 MIN: ICD-10-PCS | Mod: 25,S$GLB,, | Performed by: PEDIATRICS

## 2019-07-02 NOTE — PROGRESS NOTES
HPI:  Dysuria  Patient presents with urinary frequency, urinary retention and urinary urgency  beginning 3 weeks ago. Had 2 weeks dysuria , then patient was seen in ED on 6/22 and diagnosed with dysuria, given Rx for pyridium 200 mg tid which has not significantly helped with her urinary urgency/frequency or feeling of incomplete voiding. OBGYN is Dr. Peña and patient has been diagnosed with possible PCOS (last appt was 6/11).   Feels like bladder doesn't completely empty, and she feels like her main symptom is urinary frequency. No obvious dysuria or abdominal pain.  Associated symptoms include: vaginal discharge. Symptoms which are not present include: abdominal pain, back pain, chills, vomiting and fever. UTI history: no recent UTI's.   LMP started 2 days ago    Past Medical Hx:  I have reviewed patient's past medical history and it is pertinent for:    Patient Active Problem List    Diagnosis Date Noted    Obesity 09/26/2012     Review of Systems   Constitutional: Negative for chills and fever.   HENT: Negative for congestion and sore throat.    Respiratory: Negative for cough and wheezing.    Gastrointestinal: Negative for abdominal pain, constipation, diarrhea, nausea and vomiting.   Genitourinary: Positive for frequency and urgency. Negative for dysuria, flank pain and hematuria.   Skin: Negative for rash.     Physical Exam   Constitutional: She appears well-developed and well-nourished. No distress.   HENT:   Head: Normocephalic.   Right Ear: External ear normal.   Left Ear: External ear normal.   Nose: Nose normal.   Mouth/Throat: Oropharynx is clear and moist. No oropharyngeal exudate.   Eyes: Conjunctivae are normal.   Neck: Neck supple.   Cardiovascular: Normal rate, regular rhythm and normal heart sounds. Exam reveals no gallop and no friction rub.   No murmur heard.  Pulmonary/Chest: Effort normal and breath sounds normal. No respiratory distress. She has no wheezes. She has no rales.    Genitourinary: Vagina normal. There is no rash, tenderness or lesion on the right labia. There is no rash, tenderness or lesion on the left labia. No erythema in the vagina. No vaginal discharge found.   Neurological: She is alert.   Skin: Skin is warm.   Nursing note and vitals reviewed.    Assessment and Plan:  Urinary frequency  -     Ambulatory Referral to Urology    Incomplete bladder emptying  -     Ambulatory Referral to Urology    Vaginal discharge  -     POCT urine pregnancy  -     POCT URINALYSIS  -     Urine culture  -     C. trachomatis/N. gonorrhoeae by AMP DNA  -     VAGINOSIS SCREEN BY DNA PROBE  -     Nursing communication      1.  Guidance given regarding: safe sexual practices including consistent condom use, making f/u appt with OBGYN and making appt with urology especially if infectious workup for urinary frequency/urgency negative. Family expressed agreement and understanding of plan and all questions were answered. 25 minutes spent, >50% of which was spent in direct patient care and counseling. Discussed with family reasons to return to clinic or seek emergency medical care.  [phone] (patient's cell - call her directly with test results)

## 2019-07-03 LAB
C TRACH DNA SPEC QL NAA+PROBE: NOT DETECTED
N GONORRHOEA DNA SPEC QL NAA+PROBE: NOT DETECTED

## 2019-07-04 ENCOUNTER — TELEPHONE (OUTPATIENT)
Dept: PEDIATRICS | Facility: CLINIC | Age: 19
End: 2019-07-04

## 2019-07-04 DIAGNOSIS — N30.01 ACUTE CYSTITIS WITH HEMATURIA: Primary | ICD-10-CM

## 2019-07-04 RX ORDER — CEFDINIR 300 MG/1
300 CAPSULE ORAL 2 TIMES DAILY
Qty: 20 CAPSULE | Refills: 0 | Status: SHIPPED | OUTPATIENT
Start: 2019-07-04 | End: 2019-07-14

## 2019-07-05 ENCOUNTER — TELEPHONE (OUTPATIENT)
Dept: PEDIATRICS | Facility: CLINIC | Age: 19
End: 2019-07-05

## 2019-07-05 LAB — BACTERIA UR CULT: ABNORMAL

## 2019-07-05 NOTE — TELEPHONE ENCOUNTER
----- Message from Zeinab Holbrook MD sent at 7/5/2019 11:35 AM CDT -----  Please let family know that urinary tract infection is due to Klebsiella which is a common type of bacteria that causes UTIs, and will be sensitive/treated by her current antibiotic (Cefdinir). She should complete whole course of antibiotic as prescribed. She may call if questions/concerns. Thank you!  -MM    CHSETER for pt.  Lab work came back with UTI positive, due to current antibiotics that she is on.  She should complete whole course of antibiotic as prescribed..

## 2019-11-18 ENCOUNTER — HOSPITAL ENCOUNTER (EMERGENCY)
Facility: OTHER | Age: 19
Discharge: HOME | End: 2019-11-18
Attending: EMERGENCY MEDICINE
Payer: MEDICAID

## 2019-11-18 VITALS
OXYGEN SATURATION: 100 % | RESPIRATION RATE: 17 BRPM | SYSTOLIC BLOOD PRESSURE: 128 MMHG | DIASTOLIC BLOOD PRESSURE: 60 MMHG | HEIGHT: 63 IN | WEIGHT: 258 LBS | TEMPERATURE: 98 F | HEART RATE: 73 BPM | BODY MASS INDEX: 45.71 KG/M2

## 2019-11-18 DIAGNOSIS — S09.90XA INJURY OF HEAD, INITIAL ENCOUNTER: Primary | ICD-10-CM

## 2019-11-18 LAB
B-HCG UR QL: NEGATIVE
CTP QC/QA: YES

## 2019-11-18 PROCEDURE — 81025 URINE PREGNANCY TEST: CPT | Performed by: EMERGENCY MEDICINE

## 2019-11-18 PROCEDURE — 25000003 PHARM REV CODE 250: Performed by: PHYSICIAN ASSISTANT

## 2019-11-18 PROCEDURE — 99284 EMERGENCY DEPT VISIT MOD MDM: CPT | Mod: 25

## 2019-11-18 RX ORDER — ACETAMINOPHEN 500 MG
1000 TABLET ORAL
Status: COMPLETED | OUTPATIENT
Start: 2019-11-18 | End: 2019-11-18

## 2019-11-18 RX ORDER — IBUPROFEN 600 MG/1
600 TABLET ORAL
Status: COMPLETED | OUTPATIENT
Start: 2019-11-18 | End: 2019-11-18

## 2019-11-18 RX ADMIN — IBUPROFEN 600 MG: 600 TABLET, FILM COATED ORAL at 07:11

## 2019-11-18 RX ADMIN — ACETAMINOPHEN 1000 MG: 500 TABLET ORAL at 07:11

## 2019-11-19 NOTE — ED PROVIDER NOTES
Encounter Date: 11/18/2019       History     Chief Complaint   Patient presents with    Fall     L side facial pain after a trip and fall yesterday. reports hitting head on concrete, denies LOC      Patient is a 19-year-old female with no significant medical history who presents to the emergency department after a fall.  Patient states early this morning she was drinking a large amount of alcohol.  She states she was dancing, when she fell hitting her head.  She states she is unsure if she lost consciousness.  She states since then she has had multiple episodes of vomiting. She reports headache to the left side of her head.  She describes the pain is a throbbing sensation that is worse upon movement.  She denies visual disturbance.  She reports severe nausea.  She denies any neck pain. She rates her pain 5/10.    The history is provided by the patient.     Review of patient's allergies indicates:  No Known Allergies  Past Medical History:   Diagnosis Date    Obesity      No past surgical history on file.  Family History   Problem Relation Age of Onset    Cancer Neg Hx      Social History     Tobacco Use    Smoking status: Never Smoker    Smokeless tobacco: Never Used   Substance Use Topics    Alcohol use: Yes    Drug use: Yes     Frequency: 1.0 times per week     Types: Marijuana     Review of Systems   Constitutional: Negative for activity change, appetite change, chills, fatigue and fever.   HENT: Negative for congestion, ear discharge, ear pain, postnasal drip, rhinorrhea, sore throat and trouble swallowing.    Respiratory: Negative for cough and shortness of breath.    Cardiovascular: Negative for chest pain.   Gastrointestinal: Positive for nausea and vomiting. Negative for abdominal pain, blood in stool, constipation and diarrhea.   Genitourinary: Negative for dysuria, flank pain and hematuria.   Musculoskeletal: Negative for back pain, neck pain and neck stiffness.   Skin: Negative for rash and wound.    Neurological: Positive for dizziness and headaches. Negative for speech difficulty and weakness.       Physical Exam     Initial Vitals [11/18/19 1918]   BP Pulse Resp Temp SpO2   (!) 139/99 82 16 99.1 °F (37.3 °C) 100 %      MAP       --         Physical Exam    Nursing note and vitals reviewed.  Constitutional: She appears well-developed and well-nourished. She is not diaphoretic.  Non-toxic appearance. No distress.   HENT:   Head: Normocephalic. Head is without raccoon's eyes and without Polo's sign.   Right Ear: Hearing, tympanic membrane, external ear and ear canal normal.   Left Ear: Hearing, tympanic membrane, external ear and ear canal normal.   Nose: Nose normal.   Mouth/Throat: Uvula is midline, oropharynx is clear and moist and mucous membranes are normal. No oropharyngeal exudate.   Eyes: Conjunctivae are normal. Pupils are equal, round, and reactive to light.   Neck: Normal range of motion and full passive range of motion without pain. Neck supple. Normal range of motion present. No neck rigidity.   Cardiovascular: Normal rate and regular rhythm.   Pulmonary/Chest: Breath sounds normal.   Abdominal: Soft. Bowel sounds are normal. There is no tenderness.   Lymphadenopathy:     She has no cervical adenopathy.   Neurological: She is alert and oriented to person, place, and time. She has normal strength. No cranial nerve deficit or sensory deficit. She displays a negative Romberg sign.   Skin: Skin is warm and dry. Capillary refill takes less than 2 seconds.   Psychiatric: She has a normal mood and affect.         ED Course   Procedures  Labs Reviewed - No data to display       Imaging Results    None          Medical Decision Making:   Initial Assessment:   Urgent evaluation of a 19-year-old female with no significant medical history who presents to the emergency department after a fall.  Patient is afebrile, nontoxic appearing, and hemodynamically stable. Head injury occurred at 12:30 a.m..  No  midline tenderness of spine.  Patient was drinking alcohol and is unsure if she lost consciousness.  Patient is having posttraumatic headache. Will obtain head CT at this time.  Patient is given Tylenol and ibuprofen.  Clinical Tests:   Radiological Study: Ordered and Reviewed  ED Management:  Head CT is unremarkable. Patient is given head injury precautions.  She is advised to refrain from high contact sports.  She is advised to follow up with PCP or return to the emergency department with any worsening symptoms or concerns.                                 Clinical Impression:       ICD-10-CM ICD-9-CM   1. Injury of head, initial encounter S09.90XA 959.01                             Tesha Pfeiffer PA-C  11/18/19 2015

## 2019-11-19 NOTE — ED TRIAGE NOTES
"Pt states she was "intoxicated and fell this morning and hit her head on concrete."  Currently c/o L-sided facial pain and L sided HA.  Denies LOC.  Denies any nausea currently, denies any visual changes.  "

## 2019-12-06 ENCOUNTER — HOSPITAL ENCOUNTER (EMERGENCY)
Facility: OTHER | Age: 19
Discharge: HOME | End: 2019-12-07
Attending: EMERGENCY MEDICINE
Payer: MEDICAID

## 2019-12-06 DIAGNOSIS — M79.673 FOOT PAIN: ICD-10-CM

## 2019-12-06 DIAGNOSIS — J06.9 UPPER RESPIRATORY TRACT INFECTION, UNSPECIFIED TYPE: ICD-10-CM

## 2019-12-06 DIAGNOSIS — M79.672 FOOT PAIN, LEFT: Primary | ICD-10-CM

## 2019-12-06 PROCEDURE — 99284 EMERGENCY DEPT VISIT MOD MDM: CPT | Mod: 25

## 2019-12-07 VITALS
SYSTOLIC BLOOD PRESSURE: 119 MMHG | HEART RATE: 89 BPM | HEIGHT: 63 IN | WEIGHT: 256 LBS | RESPIRATION RATE: 18 BRPM | OXYGEN SATURATION: 98 % | DIASTOLIC BLOOD PRESSURE: 71 MMHG | BODY MASS INDEX: 45.36 KG/M2 | TEMPERATURE: 99 F

## 2019-12-07 LAB
B-HCG UR QL: NEGATIVE
CTP QC/QA: YES
CTP QC/QA: YES
POC MOLECULAR INFLUENZA A AGN: NEGATIVE
POC MOLECULAR INFLUENZA B AGN: NEGATIVE

## 2019-12-07 PROCEDURE — 81025 URINE PREGNANCY TEST: CPT | Performed by: EMERGENCY MEDICINE

## 2019-12-07 RX ORDER — FLUTICASONE PROPIONATE 50 MCG
1 SPRAY, SUSPENSION (ML) NASAL 2 TIMES DAILY PRN
Qty: 15 G | Refills: 0 | Status: SHIPPED | OUTPATIENT
Start: 2019-12-07 | End: 2022-11-08

## 2019-12-07 RX ORDER — BENZONATATE 100 MG/1
100 CAPSULE ORAL 3 TIMES DAILY PRN
Qty: 20 CAPSULE | Refills: 0 | Status: SHIPPED | OUTPATIENT
Start: 2019-12-07 | End: 2019-12-17

## 2019-12-07 RX ORDER — IBUPROFEN 600 MG/1
600 TABLET ORAL EVERY 6 HOURS PRN
Qty: 20 TABLET | Refills: 0 | Status: SHIPPED | OUTPATIENT
Start: 2019-12-07 | End: 2021-09-26 | Stop reason: ALTCHOICE

## 2019-12-07 NOTE — ED PROVIDER NOTES
Encounter Date: 12/6/2019    SCRIBE #1 NOTE: I, Kylah Tiwari, am scribing for, and in the presence of, Dr. Romero.       History     Chief Complaint   Patient presents with    Foot Pain     Pt CO nontraumatic left foot pain since Tuesday. Mild relief with ibuprofen.      Time seen by provider: 12:21 AM    This is a 19 y.o. female who presents with complaint of nontraumatic pain to left lateral dorsal foot for four days. Onset began while at work, where she is constantly on her feet and does frequent heavy lifting. She rates pain 8/10 with weight bearing and 5/10 when not bearing weight. She has taken ibuprofen 800 mg and used a foot brace with no significant relief. She reports subjective fever, chills, productive cough, and myalgias today. She denies congestion, rhinorrhea, sore throat, nausea, or vomiting. She denies use of alcohol, tobacco, illicit drugs. She also notes persistent left sided headache s/p head injury, which she was seen here for about two weeks ago.    The history is provided by the patient.     Review of patient's allergies indicates:  No Known Allergies  Past Medical History:   Diagnosis Date    Obesity      History reviewed. No pertinent surgical history.  Family History   Problem Relation Age of Onset    Cancer Neg Hx      Social History     Tobacco Use    Smoking status: Never Smoker    Smokeless tobacco: Never Used   Substance Use Topics    Alcohol use: Not Currently    Drug use: Yes     Frequency: 1.0 times per week     Types: Marijuana     Review of Systems   Constitutional: Positive for chills and fever (Subjective).   HENT: Negative for congestion, rhinorrhea and sore throat.    Eyes: Negative for visual disturbance.   Respiratory: Positive for cough. Negative for shortness of breath.    Cardiovascular: Negative for chest pain and palpitations.   Gastrointestinal: Negative for abdominal pain, diarrhea and vomiting.   Genitourinary: Negative for decreased urine volume, dysuria and  vaginal discharge.   Musculoskeletal: Positive for myalgias. Negative for joint swelling, neck pain and neck stiffness.        Positive for left foot pain.   Skin: Negative for rash and wound.   Neurological: Positive for headaches. Negative for weakness and numbness.   Psychiatric/Behavioral: Negative for confusion.       Physical Exam     Initial Vitals [12/06/19 2316]   BP Pulse Resp Temp SpO2   137/85 99 18 98.4 °F (36.9 °C) 98 %      MAP       --         Physical Exam    Nursing note and vitals reviewed.  Constitutional: She appears well-developed and well-nourished. She is not diaphoretic. No distress.   HENT:   Head: Normocephalic and atraumatic.   Right Ear: Tympanic membrane normal.   Left Ear: Tympanic membrane normal.   Nose: Nose normal.   Mouth/Throat: Oropharynx is clear and moist.   Eyes: EOM are normal. Pupils are equal, round, and reactive to light.   Neck: Normal range of motion. Neck supple.   Cardiovascular: Normal rate, regular rhythm and normal heart sounds. Exam reveals no gallop and no friction rub.    No murmur heard.  Pulmonary/Chest: Breath sounds normal. No respiratory distress. She has no wheezes. She has no rhonchi. She has no rales.   Abdominal: Soft. There is no tenderness.   Musculoskeletal: Normal range of motion. She exhibits no edema.   LLE: No tenderness of ankle. Tenderness to base of 5th metatarsal and lateral dorsal foot.   Neurological: She is alert and oriented to person, place, and time. She has normal strength. No cranial nerve deficit or sensory deficit. GCS score is 15. GCS eye subscore is 4. GCS verbal subscore is 5. GCS motor subscore is 6.   Skin: Skin is warm and dry. No erythema.   Psychiatric: She has a normal mood and affect. Her behavior is normal. Judgment and thought content normal.         ED Course   Procedures  Labs Reviewed   POCT URINE PREGNANCY - Normal   POCT INFLUENZA A/B MOLECULAR          Imaging Results          X-Ray Foot Complete Left (Final  result)  Result time 12/07/19 01:18:47    Final result by Filemon Blanc MD (12/07/19 01:18:47)                 Impression:      No acute osseous abnormality identified.      Electronically signed by: Filemon Blanc MD  Date:    12/07/2019  Time:    01:18             Narrative:    EXAMINATION:  XR FOOT COMPLETE 3 VIEW LEFT    CLINICAL HISTORY:  .  Pain in unspecified foot    TECHNIQUE:  AP, lateral and oblique views of the left foot were performed.    COMPARISON:  None    FINDINGS:  No evidence of acute displaced fracture, dislocation, or osseous destructive process.  Lisfranc articulation is congruent.  No radiopaque retained foreign body seen.                              X-Rays:   Independently Interpreted Readings:   Other Readings:  Left Foot: No fracture, dislocation, or acute process. Will defer to radiology.    Medical Decision Making:   History:   Old Medical Records: I decided to obtain old medical records.  Independently Interpreted Test(s):   I have ordered and independently interpreted X-rays - see prior notes.  Clinical Tests:   Lab Tests: Ordered and Reviewed  Radiological Study: Ordered and Reviewed  ED Management:  Emergent evaluation a 19-year-old female with complaint of nontraumatic foot pain, also URI symptoms x1 day.  Vital signs are benign, afebrile.  Physical exam reveals tenderness of the foot.  X-ray shows no fracture or dislocation.  On exam there is no evidence of cellulitis or skin infection or vascular compromise.  For URI symptoms, there is no shortness of breath or chest pain or measured fever, no chest x-ray is indicated.  Influenza swab was performed and negative. I believe this represents viral URI with cough.  Ultimately she is discharged in good condition with prescriptions for ibuprofen, Flonase, Tessalon.  She is encouraged close follow-up with her PCP or to return for any new or worsening symptoms.            Scribe Attestation:   Scribe #1: I performed the above scribed  service and the documentation accurately describes the services I performed. I attest to the accuracy of the note.    Attending Attestation:           Physician Attestation for Scribe:  Physician Attestation Statement for Scribe #1: I, Dr. Romero, reviewed documentation, as scribed by Kylah Tiwari in my presence, and it is both accurate and complete.                               Clinical Impression:     1. Foot pain, left    2. Foot pain    3. Upper respiratory tract infection, unspecified type                              Bhumi Romero MD  12/07/19 0510

## 2019-12-07 NOTE — ED TRIAGE NOTES
"PT to ED with CO left foot pain since Tuesday. Pt now states she is unsure if she injured her foot. Pt states "Maybe I dont know I was standing at work".   " [id number]:;

## 2021-02-07 ENCOUNTER — HOSPITAL ENCOUNTER (EMERGENCY)
Facility: OTHER | Age: 21
Discharge: HOME | End: 2021-02-07
Attending: EMERGENCY MEDICINE
Payer: MEDICAID

## 2021-02-07 VITALS
OXYGEN SATURATION: 99 % | SYSTOLIC BLOOD PRESSURE: 110 MMHG | HEIGHT: 63 IN | WEIGHT: 280 LBS | HEART RATE: 83 BPM | RESPIRATION RATE: 16 BRPM | BODY MASS INDEX: 49.61 KG/M2 | DIASTOLIC BLOOD PRESSURE: 55 MMHG | TEMPERATURE: 98 F

## 2021-02-07 DIAGNOSIS — N94.6 DYSMENORRHEA: Primary | ICD-10-CM

## 2021-02-07 DIAGNOSIS — N92.0 MENORRHAGIA WITH REGULAR CYCLE: ICD-10-CM

## 2021-02-07 LAB
ALBUMIN SERPL BCP-MCNC: 3.6 G/DL (ref 3.5–5.2)
ALP SERPL-CCNC: 92 U/L (ref 55–135)
ALT SERPL W/O P-5'-P-CCNC: 13 U/L (ref 10–44)
ANION GAP SERPL CALC-SCNC: 9 MMOL/L (ref 8–16)
AST SERPL-CCNC: 13 U/L (ref 10–40)
B-HCG UR QL: NEGATIVE
BACTERIA #/AREA URNS HPF: ABNORMAL /HPF
BACTERIA GENITAL QL WET PREP: ABNORMAL
BASOPHILS # BLD AUTO: 0.02 K/UL (ref 0–0.2)
BASOPHILS NFR BLD: 0.2 % (ref 0–1.9)
BILIRUB SERPL-MCNC: 0.3 MG/DL (ref 0.1–1)
BILIRUB UR QL STRIP: NEGATIVE
BUN SERPL-MCNC: 6 MG/DL (ref 6–20)
CALCIUM SERPL-MCNC: 9.3 MG/DL (ref 8.7–10.5)
CHLORIDE SERPL-SCNC: 107 MMOL/L (ref 95–110)
CLARITY UR: ABNORMAL
CLUE CELLS VAG QL WET PREP: ABNORMAL
CO2 SERPL-SCNC: 25 MMOL/L (ref 23–29)
COLOR UR: YELLOW
CREAT SERPL-MCNC: 0.8 MG/DL (ref 0.5–1.4)
CTP QC/QA: YES
DIFFERENTIAL METHOD: ABNORMAL
EOSINOPHIL # BLD AUTO: 0.1 K/UL (ref 0–0.5)
EOSINOPHIL NFR BLD: 1.5 % (ref 0–8)
ERYTHROCYTE [DISTWIDTH] IN BLOOD BY AUTOMATED COUNT: 17.7 % (ref 11.5–14.5)
EST. GFR  (AFRICAN AMERICAN): >60 ML/MIN/1.73 M^2
EST. GFR  (NON AFRICAN AMERICAN): >60 ML/MIN/1.73 M^2
FILAMENT FUNGI VAG WET PREP-#/AREA: ABNORMAL
GLUCOSE SERPL-MCNC: 124 MG/DL (ref 70–110)
GLUCOSE UR QL STRIP: NEGATIVE
HCT VFR BLD AUTO: 38.9 % (ref 37–48.5)
HGB BLD-MCNC: 11.6 G/DL (ref 12–16)
HGB UR QL STRIP: ABNORMAL
IMM GRANULOCYTES # BLD AUTO: 0.02 K/UL (ref 0–0.04)
IMM GRANULOCYTES NFR BLD AUTO: 0.2 % (ref 0–0.5)
KETONES UR QL STRIP: NEGATIVE
LEUKOCYTE ESTERASE UR QL STRIP: ABNORMAL
LYMPHOCYTES # BLD AUTO: 2.2 K/UL (ref 1–4.8)
LYMPHOCYTES NFR BLD: 27.1 % (ref 18–48)
MCH RBC QN AUTO: 23.4 PG (ref 27–31)
MCHC RBC AUTO-ENTMCNC: 29.8 G/DL (ref 32–36)
MCV RBC AUTO: 78 FL (ref 82–98)
MICROSCOPIC COMMENT: ABNORMAL
MONOCYTES # BLD AUTO: 0.4 K/UL (ref 0.3–1)
MONOCYTES NFR BLD: 5.3 % (ref 4–15)
NEUTROPHILS # BLD AUTO: 5.3 K/UL (ref 1.8–7.7)
NEUTROPHILS NFR BLD: 65.7 % (ref 38–73)
NITRITE UR QL STRIP: NEGATIVE
NRBC BLD-RTO: 0 /100 WBC
PH UR STRIP: 6 [PH] (ref 5–8)
PLATELET # BLD AUTO: 323 K/UL (ref 150–350)
PMV BLD AUTO: 9.7 FL (ref 9.2–12.9)
POTASSIUM SERPL-SCNC: 4.5 MMOL/L (ref 3.5–5.1)
PROT SERPL-MCNC: 7.6 G/DL (ref 6–8.4)
PROT UR QL STRIP: NEGATIVE
RBC # BLD AUTO: 4.96 M/UL (ref 4–5.4)
RBC #/AREA URNS HPF: >100 /HPF (ref 0–4)
SODIUM SERPL-SCNC: 141 MMOL/L (ref 136–145)
SP GR UR STRIP: 1.02 (ref 1–1.03)
SPECIMEN SOURCE: ABNORMAL
SQUAMOUS #/AREA URNS HPF: 1 /HPF
T VAGINALIS GENITAL QL WET PREP: ABNORMAL
URN SPEC COLLECT METH UR: ABNORMAL
UROBILINOGEN UR STRIP-ACNC: NEGATIVE EU/DL
WBC # BLD AUTO: 8.13 K/UL (ref 3.9–12.7)
WBC #/AREA URNS HPF: 2 /HPF (ref 0–5)
WBC #/AREA VAG WET PREP: ABNORMAL
YEAST GENITAL QL WET PREP: ABNORMAL

## 2021-02-07 PROCEDURE — 96372 THER/PROPH/DIAG INJ SC/IM: CPT

## 2021-02-07 PROCEDURE — 63600175 PHARM REV CODE 636 W HCPCS: Performed by: NURSE PRACTITIONER

## 2021-02-07 PROCEDURE — 81000 URINALYSIS NONAUTO W/SCOPE: CPT

## 2021-02-07 PROCEDURE — 80053 COMPREHEN METABOLIC PANEL: CPT

## 2021-02-07 PROCEDURE — 85025 COMPLETE CBC W/AUTO DIFF WBC: CPT

## 2021-02-07 PROCEDURE — 81025 URINE PREGNANCY TEST: CPT | Performed by: EMERGENCY MEDICINE

## 2021-02-07 PROCEDURE — 87210 SMEAR WET MOUNT SALINE/INK: CPT

## 2021-02-07 PROCEDURE — 99284 EMERGENCY DEPT VISIT MOD MDM: CPT | Mod: 25

## 2021-02-07 RX ORDER — KETOROLAC TROMETHAMINE 30 MG/ML
30 INJECTION, SOLUTION INTRAMUSCULAR; INTRAVENOUS
Status: COMPLETED | OUTPATIENT
Start: 2021-02-07 | End: 2021-02-07

## 2021-02-07 RX ORDER — NAPROXEN 500 MG/1
500 TABLET ORAL 2 TIMES DAILY WITH MEALS
Qty: 30 TABLET | Refills: 0 | Status: SHIPPED | OUTPATIENT
Start: 2021-02-07 | End: 2023-12-05

## 2021-02-07 RX ADMIN — KETOROLAC TROMETHAMINE 30 MG: 30 INJECTION, SOLUTION INTRAMUSCULAR at 12:02

## 2021-04-16 ENCOUNTER — PATIENT MESSAGE (OUTPATIENT)
Dept: RESEARCH | Facility: HOSPITAL | Age: 21
End: 2021-04-16

## 2021-08-06 ENCOUNTER — LAB VISIT (OUTPATIENT)
Dept: PRIMARY CARE CLINIC | Facility: CLINIC | Age: 21
End: 2021-08-06
Payer: MEDICAID

## 2021-08-06 DIAGNOSIS — Z20.822 ENCOUNTER FOR LABORATORY TESTING FOR COVID-19 VIRUS: ICD-10-CM

## 2021-08-06 PROCEDURE — U0003 INFECTIOUS AGENT DETECTION BY NUCLEIC ACID (DNA OR RNA); SEVERE ACUTE RESPIRATORY SYNDROME CORONAVIRUS 2 (SARS-COV-2) (CORONAVIRUS DISEASE [COVID-19]), AMPLIFIED PROBE TECHNIQUE, MAKING USE OF HIGH THROUGHPUT TECHNOLOGIES AS DESCRIBED BY CMS-2020-01-R: HCPCS | Performed by: INTERNAL MEDICINE

## 2021-08-09 LAB
SARS-COV-2 RNA RESP QL NAA+PROBE: NOT DETECTED
SARS-COV-2- CYCLE NUMBER: -1

## 2021-09-25 ENCOUNTER — HOSPITAL ENCOUNTER (EMERGENCY)
Facility: HOSPITAL | Age: 21
Discharge: HOME | End: 2021-09-26
Attending: EMERGENCY MEDICINE
Payer: MEDICAID

## 2021-09-25 DIAGNOSIS — R10.2 PELVIC PAIN: Primary | ICD-10-CM

## 2021-09-25 DIAGNOSIS — M54.9 UPPER BACK PAIN: ICD-10-CM

## 2021-09-25 DIAGNOSIS — M54.50 ACUTE BILATERAL LOW BACK PAIN WITHOUT SCIATICA: ICD-10-CM

## 2021-09-25 DIAGNOSIS — K59.00 CONSTIPATION, UNSPECIFIED CONSTIPATION TYPE: ICD-10-CM

## 2021-09-25 LAB
B-HCG UR QL: NEGATIVE
BILIRUBIN, POC UA: NEGATIVE
BLOOD, POC UA: ABNORMAL
CLARITY, POC UA: ABNORMAL
COLOR, POC UA: YELLOW
CTP QC/QA: YES
GLUCOSE, POC UA: NEGATIVE
KETONES, POC UA: ABNORMAL
LEUKOCYTE EST, POC UA: ABNORMAL
NITRITE, POC UA: NEGATIVE
PH UR STRIP: 6 [PH]
PROTEIN, POC UA: ABNORMAL
SPECIFIC GRAVITY, POC UA: >=1.03
UROBILINOGEN, POC UA: 0.2 E.U./DL

## 2021-09-25 PROCEDURE — 81003 URINALYSIS AUTO W/O SCOPE: CPT | Mod: ER

## 2021-09-25 PROCEDURE — 81025 URINE PREGNANCY TEST: CPT | Mod: ER | Performed by: EMERGENCY MEDICINE

## 2021-09-25 PROCEDURE — 63600175 PHARM REV CODE 636 W HCPCS: Mod: ER | Performed by: EMERGENCY MEDICINE

## 2021-09-25 PROCEDURE — 96372 THER/PROPH/DIAG INJ SC/IM: CPT | Mod: ER

## 2021-09-25 PROCEDURE — 99285 EMERGENCY DEPT VISIT HI MDM: CPT | Mod: 25,ER

## 2021-09-25 RX ORDER — KETOROLAC TROMETHAMINE 30 MG/ML
30 INJECTION, SOLUTION INTRAMUSCULAR; INTRAVENOUS
Status: COMPLETED | OUTPATIENT
Start: 2021-09-25 | End: 2021-09-25

## 2021-09-25 RX ADMIN — KETOROLAC TROMETHAMINE 30 MG: 30 INJECTION, SOLUTION INTRAMUSCULAR; INTRAVENOUS at 11:09

## 2021-09-26 VITALS
TEMPERATURE: 99 F | SYSTOLIC BLOOD PRESSURE: 106 MMHG | OXYGEN SATURATION: 99 % | WEIGHT: 278 LBS | RESPIRATION RATE: 18 BRPM | DIASTOLIC BLOOD PRESSURE: 55 MMHG | HEART RATE: 91 BPM | BODY MASS INDEX: 49.26 KG/M2 | HEIGHT: 63 IN

## 2021-09-26 PROCEDURE — 87481 CANDIDA DNA AMP PROBE: CPT | Mod: 59 | Performed by: EMERGENCY MEDICINE

## 2021-09-26 PROCEDURE — 87591 N.GONORRHOEAE DNA AMP PROB: CPT | Performed by: EMERGENCY MEDICINE

## 2021-09-26 PROCEDURE — 87491 CHLMYD TRACH DNA AMP PROBE: CPT | Mod: 59 | Performed by: EMERGENCY MEDICINE

## 2021-09-26 RX ORDER — KETOROLAC TROMETHAMINE 10 MG/1
10 TABLET, FILM COATED ORAL EVERY 6 HOURS PRN
Qty: 12 TABLET | Refills: 0 | Status: SHIPPED | OUTPATIENT
Start: 2021-09-26 | End: 2021-09-29

## 2021-09-26 RX ORDER — SYRING-NEEDL,DISP,INSUL,0.3 ML 29 G X1/2"
296 SYRINGE, EMPTY DISPOSABLE MISCELLANEOUS ONCE
Qty: 295 ML | Refills: 0 | Status: SHIPPED | OUTPATIENT
Start: 2021-09-26 | End: 2021-09-26

## 2021-09-26 RX ORDER — METHOCARBAMOL 750 MG/1
1500 TABLET, FILM COATED ORAL EVERY 6 HOURS
Qty: 24 TABLET | Refills: 0 | Status: SHIPPED | OUTPATIENT
Start: 2021-09-26 | End: 2021-09-29

## 2021-09-27 LAB
BACTERIAL VAGINOSIS DNA: NEGATIVE
CANDIDA GLABRATA DNA: NEGATIVE
CANDIDA KRUSEI DNA: NEGATIVE
CANDIDA RRNA VAG QL PROBE: POSITIVE
T VAGINALIS RRNA GENITAL QL PROBE: NEGATIVE

## 2021-09-28 LAB
C TRACH DNA SPEC QL NAA+PROBE: NOT DETECTED
N GONORRHOEA DNA SPEC QL NAA+PROBE: NOT DETECTED

## 2022-05-17 ENCOUNTER — HOSPITAL ENCOUNTER (EMERGENCY)
Facility: HOSPITAL | Age: 22
Discharge: HOME | End: 2022-05-17
Attending: EMERGENCY MEDICINE
Payer: MEDICAID

## 2022-05-17 VITALS
DIASTOLIC BLOOD PRESSURE: 86 MMHG | RESPIRATION RATE: 18 BRPM | TEMPERATURE: 99 F | HEART RATE: 77 BPM | HEIGHT: 63 IN | WEIGHT: 276 LBS | OXYGEN SATURATION: 99 % | SYSTOLIC BLOOD PRESSURE: 129 MMHG | BODY MASS INDEX: 48.9 KG/M2

## 2022-05-17 DIAGNOSIS — K57.92 DIVERTICULITIS: Primary | ICD-10-CM

## 2022-05-17 LAB
ALBUMIN SERPL-MCNC: 3.5 G/DL (ref 3.3–5.5)
ALP SERPL-CCNC: 113 U/L (ref 42–141)
B-HCG UR QL: NEGATIVE
BILIRUB SERPL-MCNC: 0.6 MG/DL (ref 0.2–1.6)
BILIRUBIN, POC UA: NEGATIVE
BLOOD, POC UA: NEGATIVE
BUN SERPL-MCNC: 7 MG/DL (ref 7–22)
CALCIUM SERPL-MCNC: 9.3 MG/DL (ref 8–10.3)
CHLORIDE SERPL-SCNC: 109 MMOL/L (ref 98–108)
CLARITY, POC UA: CLEAR
COLOR, POC UA: YELLOW
CREAT SERPL-MCNC: 0.7 MG/DL (ref 0.6–1.2)
CTP QC/QA: YES
GLUCOSE SERPL-MCNC: 103 MG/DL (ref 73–118)
GLUCOSE, POC UA: NEGATIVE
HCT, POC: NORMAL
HGB, POC: NORMAL (ref 14–18)
KETONES, POC UA: NEGATIVE
LEUKOCYTE EST, POC UA: NEGATIVE
MCH, POC: NORMAL
MCHC, POC: NORMAL
MCV, POC: NORMAL
MPV, POC: NORMAL
NITRITE, POC UA: NEGATIVE
PH UR STRIP: 7 [PH]
POC ALT (SGPT): 14 U/L (ref 10–47)
POC AST (SGOT): 21 U/L (ref 11–38)
POC PLATELET COUNT: NORMAL
POC TCO2: 30 MMOL/L (ref 18–33)
POTASSIUM BLD-SCNC: 4.2 MMOL/L (ref 3.6–5.1)
PROTEIN, POC UA: NEGATIVE
PROTEIN, POC: 7.3 G/DL (ref 6.4–8.1)
RBC, POC: NORMAL
RDW, POC: NORMAL
SODIUM BLD-SCNC: 143 MMOL/L (ref 128–145)
SPECIFIC GRAVITY, POC UA: 1.02
UROBILINOGEN, POC UA: 0.2 E.U./DL
WBC, POC: NORMAL

## 2022-05-17 PROCEDURE — 99285 EMERGENCY DEPT VISIT HI MDM: CPT | Mod: 25,ER

## 2022-05-17 PROCEDURE — 96376 TX/PRO/DX INJ SAME DRUG ADON: CPT | Mod: ER

## 2022-05-17 PROCEDURE — 81025 URINE PREGNANCY TEST: CPT | Mod: ER | Performed by: EMERGENCY MEDICINE

## 2022-05-17 PROCEDURE — 80053 COMPREHEN METABOLIC PANEL: CPT | Mod: ER

## 2022-05-17 PROCEDURE — 81003 URINALYSIS AUTO W/O SCOPE: CPT | Mod: ER

## 2022-05-17 PROCEDURE — 63600175 PHARM REV CODE 636 W HCPCS: Mod: ER | Performed by: EMERGENCY MEDICINE

## 2022-05-17 PROCEDURE — 25000003 PHARM REV CODE 250: Mod: ER | Performed by: EMERGENCY MEDICINE

## 2022-05-17 PROCEDURE — 96361 HYDRATE IV INFUSION ADD-ON: CPT | Mod: ER

## 2022-05-17 PROCEDURE — 85025 COMPLETE CBC W/AUTO DIFF WBC: CPT | Mod: ER

## 2022-05-17 PROCEDURE — 96374 THER/PROPH/DIAG INJ IV PUSH: CPT | Mod: ER

## 2022-05-17 RX ORDER — CIPROFLOXACIN 500 MG/1
500 TABLET ORAL 2 TIMES DAILY
Qty: 14 TABLET | Refills: 0 | Status: SHIPPED | OUTPATIENT
Start: 2022-05-17 | End: 2022-05-24

## 2022-05-17 RX ORDER — KETOROLAC TROMETHAMINE 30 MG/ML
15 INJECTION, SOLUTION INTRAMUSCULAR; INTRAVENOUS
Status: COMPLETED | OUTPATIENT
Start: 2022-05-17 | End: 2022-05-17

## 2022-05-17 RX ORDER — ONDANSETRON 4 MG/1
4 TABLET, ORALLY DISINTEGRATING ORAL EVERY 6 HOURS PRN
Qty: 10 TABLET | Refills: 0 | Status: SHIPPED | OUTPATIENT
Start: 2022-05-17 | End: 2023-12-05

## 2022-05-17 RX ORDER — TRAMADOL HYDROCHLORIDE 50 MG/1
50 TABLET ORAL EVERY 6 HOURS PRN
Qty: 12 TABLET | Refills: 0 | Status: SHIPPED | OUTPATIENT
Start: 2022-05-17 | End: 2022-05-27

## 2022-05-17 RX ORDER — SODIUM CHLORIDE 9 MG/ML
1000 INJECTION, SOLUTION INTRAVENOUS
Status: COMPLETED | OUTPATIENT
Start: 2022-05-17 | End: 2022-05-17

## 2022-05-17 RX ORDER — METRONIDAZOLE 500 MG/1
500 TABLET ORAL 3 TIMES DAILY
Qty: 21 TABLET | Refills: 0 | Status: SHIPPED | OUTPATIENT
Start: 2022-05-17 | End: 2022-05-24

## 2022-05-17 RX ADMIN — SODIUM CHLORIDE 1000 ML: 0.9 INJECTION, SOLUTION INTRAVENOUS at 05:05

## 2022-05-17 RX ADMIN — KETOROLAC TROMETHAMINE 15 MG: 30 INJECTION, SOLUTION INTRAMUSCULAR at 07:05

## 2022-05-17 RX ADMIN — KETOROLAC TROMETHAMINE 15 MG: 30 INJECTION, SOLUTION INTRAMUSCULAR at 05:05

## 2022-05-17 NOTE — ED PROVIDER NOTES
Encounter Date: 5/17/2022    SCRIBE #1 NOTE: I, Claribel Frye, am scribing for, and in the presence of,  Aleida Washington MD. I have scribed the following portions of the note - Other sections scribed: HPI, ROS, PE.       History     Chief Complaint   Patient presents with    Abdominal Pain     Pt has been having lower abdominal for approx 1 week. Pt states she is having worse pain with passing gas, having bowel movement or urination. Pt states pain is worse on the left side. Pt also has nausea. Pt states she just had a bowel movement with mucus or a jelly like texture       21 y.o. female with Hx of PCOS who presents to the ED with chief complaint of acute left lower and suprapubic abdominal pain with pelvic pain exacerbated by urination and bowel movements for 1 week. Additionally endorses non-bloody diarrhea, nausea, and frequency. Reports LMP on 3/5/22; she has irregular cycles. Endorses IUD placement. Denies vomiting, back pain, vaginal bleeding, or change in vaginal discharge. No further complaints at this time.    The history is provided by the patient. No  was used.     Review of patient's allergies indicates:  No Known Allergies  Past Medical History:   Diagnosis Date    Obesity      History reviewed. No pertinent surgical history.  Family History   Problem Relation Age of Onset    Cancer Neg Hx      Social History     Tobacco Use    Smoking status: Never Smoker    Smokeless tobacco: Never Used   Substance Use Topics    Alcohol use: Not Currently    Drug use: Yes     Frequency: 1.0 times per week     Types: Marijuana     Review of Systems   Constitutional: Negative.  Negative for fever.   HENT: Negative.  Negative for sore throat.    Eyes: Negative.  Negative for pain.   Respiratory: Negative.  Negative for shortness of breath.    Cardiovascular: Negative.  Negative for chest pain.   Gastrointestinal: Positive for abdominal pain, diarrhea and nausea. Negative for vomiting.    Endocrine: Negative.    Genitourinary: Positive for frequency and pelvic pain. Negative for dysuria, vaginal bleeding and vaginal discharge.   Musculoskeletal: Negative.  Negative for back pain.   Skin: Negative.  Negative for rash.   Allergic/Immunologic: Negative.    Neurological: Negative.  Negative for weakness.   Hematological: Negative.    Psychiatric/Behavioral: Negative.    All other systems reviewed and are negative.      Physical Exam     Initial Vitals [05/17/22 1636]   BP Pulse Resp Temp SpO2   (!) 144/82 73 18 98.6 °F (37 °C) 98 %      MAP       --         Physical Exam    Nursing note and vitals reviewed.  Constitutional: Vital signs are normal. She appears well-developed. She is not diaphoretic. She is active and cooperative. No distress.   Appears uncomfortable.   HENT:   Head: Normocephalic and atraumatic.   Mouth/Throat: Oropharynx is clear and moist.   Eyes: Conjunctivae, EOM and lids are normal. Pupils are equal, round, and reactive to light. No scleral icterus.   Neck: Trachea normal. Neck supple. No thyroid mass present. No tracheal deviation present.   Normal range of motion.   Full passive range of motion without pain.     Cardiovascular: Normal rate, regular rhythm, S1 normal, S2 normal, normal heart sounds, intact distal pulses and normal pulses.   Pulmonary/Chest: Breath sounds normal. No stridor. No respiratory distress.   Abdominal: Abdomen is soft. Bowel sounds are normal. She exhibits no distension. There is abdominal tenderness in the left lower quadrant.   Left pelvic tenderness. There is no rebound and no guarding.   Musculoskeletal:         General: No tenderness or edema. Normal range of motion.      Cervical back: Full passive range of motion without pain, normal range of motion and neck supple.     Lymphadenopathy:     She has no axillary adenopathy.   Neurological: She is alert and oriented to person, place, and time. She has normal strength. No cranial nerve deficit or  sensory deficit. GCS score is 15. GCS eye subscore is 4. GCS verbal subscore is 5. GCS motor subscore is 6.   Skin: Skin is warm, dry and intact.   Psychiatric: She has a normal mood and affect. Her speech is normal and behavior is normal. Judgment and thought content normal. Cognition and memory are normal.         ED Course   Procedures  Labs Reviewed   POCT CMP - Abnormal; Notable for the following components:       Result Value    POC Chloride 109 (*)     All other components within normal limits   POCT URINALYSIS W/O SCOPE   POCT URINE PREGNANCY   POCT URINALYSIS W/O SCOPE   POCT CBC   POCT CMP          Imaging Results          US Pelvis Comp with Transvag NON-OB (xpd) (Final result)  Result time 05/17/22 19:12:43   Procedure changed from US Pelvis Limited Non OB     Final result by Filemon Blanc MD (05/17/22 19:12:43)                 Impression:      1. No acute abnormalities identified.  No evidence of ovarian torsion.  See separate CT report for additional details.  2. Suspected low lying IUD.      Electronically signed by: Filemon Blanc MD  Date:    05/17/2022  Time:    19:12             Narrative:    EXAMINATION:  US PELVIS COMP WITH TRANSVAG NON-OB (XPD)    CLINICAL HISTORY:  r/o ovarian torsion;    TECHNIQUE:  Transabdominal sonography of the pelvis was performed, followed by transvaginal sonography to better evaluate the uterus and ovaries.    COMPARISON:  CT abdomen and pelvis from the same date.    FINDINGS:  The uterus measures 8 x 4 x 4 cm. Uterine parenchyma is homogeneous without evidence for masses. The endometrial echo complex is normal in thickness and measures 6 mm.  IUD is visualized and appears low lying in position.    The right ovary measures 3 x 2 x 2 cm. The left ovary measures 3 x 2 x 3 cm. Arterial and venous flow are preserved bilaterally.  Follicles are seen in both ovaries.  No adnexal abnormalities are seen.  No significant free fluid is seen.                               CT  Abdomen Pelvis  Without Contrast (Final result)  Result time 05/17/22 17:57:27    Final result by Filemon Blanc MD (05/17/22 17:57:27)                 Impression:      Mild inflammatory changes in the left lower quadrant adjacent to the sigmoid colon which is felt to most likely represent epiploic appendagitis.  Difficult to fully exclude potential sigmoid diverticulitis, however noting that no other diverticula are seen throughout the colon or region.      Electronically signed by: Filemon Blanc MD  Date:    05/17/2022  Time:    17:57             Narrative:    EXAMINATION:  CT ABDOMEN PELVIS WITHOUT CONTRAST    CLINICAL HISTORY:  LLQ abdominal pain;    TECHNIQUE:  Low dose axial images, sagittal and coronal reformations were obtained from the lung bases to the pubic symphysis.  Oral contrast was not administered.    COMPARISON:  CT abdomen and pelvis from September 2021.    FINDINGS:  The visualized portion of the heart is unremarkable.  The lung bases are clear.    No significant hepatic abnormality seen on this noncontrast exam.  There is no intra-or extrahepatic biliary ductal dilatation.  The gallbladder is unremarkable.  The stomach, pancreas, spleen, and adrenal glands are unremarkable.    Kidneys show no evidence of stones or hydronephrosis. No stones are seen along the ureteral courses.  Urinary bladder is nondistended.  IUD is seen within the uterus.  No definite adnexal abnormalities are appreciated.    Appendix is visualized and is unremarkable.  The visualized loops of small and large bowel show no evidence of obstruction or inflammation.  Mild inflammatory changes are seen in the left lower quadrant adjacent to the sigmoid colon with central area of fat density.  No free air or free fluid.    Aorta tapers normally.    No acute osseous abnormality identified. Subcutaneous soft tissue structures are unremarkable.                                 Medications   0.9%  NaCl infusion (0 mLs Intravenous  Stopped 5/17/22 1911)   ketorolac injection 15 mg (15 mg Intravenous Given 5/17/22 1755)   ketorolac injection 15 mg (15 mg Intravenous Given 5/17/22 1909)     Medical Decision Making:   History:   Old Medical Records: I decided to obtain old medical records.  Differential Diagnosis:   Includes but is not limited to: Diverticulitis, Colitis, Ovarian torsion, Ectopic pregnancy, PID  Clinical Tests:   Lab Tests: Ordered and Reviewed  Radiological Study: Ordered and Reviewed  ED Management:  Patient is afebrile and not toxic appearing.  She has left lower quadrant tenderness but does not have an acute surgical abdomen.  Labs within acceptable ranges.  CT suggestive of diverticulitis versus epiploic appendagitis.  Patient is pending pelvic ultrasound to rule out ovarian torsion.  Patient care transferred to Dr. Patiño at the end of my shift  Aleida Washington 05/17/2022 7:00 PM     Ultrasonography did not reveal any obstruction or of flow do either ovary.  CT evaluation confirms concern for diverticulitis.  Patient will be discharged on Cipro and Flagyl as well as pain control.              Scribe Attestation:   Scribe #1: I performed the above scribed service and the documentation accurately describes the services I performed. I attest to the accuracy of the note.               I, Toni Patiño , personally performed the services described in this documentation.  All medical record entries made by the scribe were at my direction and in my presence.  I have reviewed the chart and agree that the record reflects my personal performance and is accurate and complete.  Clinical Impression:   Final diagnoses:  [K57.92] Diverticulitis (Primary)          ED Disposition Condition    Discharge Stable        ED Prescriptions     Medication Sig Dispense Start Date End Date Auth. Provider    ciprofloxacin HCl (CIPRO) 500 MG tablet Take 1 tablet (500 mg total) by mouth 2 (two) times daily. for 7 days 14 tablet 5/17/2022 5/24/2022  Toni Patiño MD    metroNIDAZOLE (FLAGYL) 500 MG tablet Take 1 tablet (500 mg total) by mouth 3 (three) times daily. for 7 days 21 tablet 5/17/2022 5/24/2022 Toni Patiño MD    ondansetron (ZOFRAN-ODT) 4 MG TbDL Take 1 tablet (4 mg total) by mouth every 6 (six) hours as needed. 10 tablet 5/17/2022  Toni Patiño MD    traMADoL (ULTRAM) 50 mg tablet Take 1 tablet (50 mg total) by mouth every 6 (six) hours as needed for Pain. 12 tablet 5/17/2022 5/27/2022 Toni Patiño MD        Follow-up Information     Follow up With Specialties Details Why Contact Info    Your PCP  Schedule an appointment as soon as possible for a visit in 1 week      GI    [phone]           Toni Patiño MD  05/18/22 0157

## 2022-05-17 NOTE — Clinical Note
"Megan Chiangok Gar was seen and treated in our emergency department on 5/17/2022.  She may return to work on 05/18/2022.       If you have any questions or concerns, please don't hesitate to call.      Courtney FAJARDO RN    "

## 2022-05-17 NOTE — Clinical Note
Toni Cifuentes accompanied their spouse to the emergency department on 5/17/2022. They may return to work on 05/18/2022.      If you have any questions or concerns, please don't hesitate to call.      SCOTTY Valdez RN

## 2022-05-17 NOTE — Clinical Note
"Megan Chiangok Gar was seen and treated in our emergency department on 5/17/2022.  She may return to work on 05/19/2022.       If you have any questions or concerns, please don't hesitate to call.      Courtney FAJARDO RN    "

## 2022-07-20 ENCOUNTER — LAB VISIT (OUTPATIENT)
Dept: LAB | Facility: HOSPITAL | Age: 22
End: 2022-07-20
Attending: NURSE PRACTITIONER
Payer: MEDICAID

## 2022-07-20 DIAGNOSIS — Z87.42 HISTORY OF PCOS: ICD-10-CM

## 2022-07-20 DIAGNOSIS — Z00.01 ENCOUNTER FOR WELL ADULT EXAM WITH ABNORMAL FINDINGS: ICD-10-CM

## 2022-07-20 LAB
ALBUMIN SERPL BCP-MCNC: 3.8 G/DL (ref 3.5–5.2)
ALP SERPL-CCNC: 118 U/L (ref 55–135)
ALT SERPL W/O P-5'-P-CCNC: 14 U/L (ref 10–44)
ANION GAP SERPL CALC-SCNC: 5 MMOL/L (ref 8–16)
AST SERPL-CCNC: 15 U/L (ref 10–40)
BASOPHILS # BLD AUTO: 0.02 K/UL (ref 0–0.2)
BASOPHILS NFR BLD: 0.3 % (ref 0–1.9)
BILIRUB SERPL-MCNC: 0.4 MG/DL (ref 0.1–1)
BILIRUB UR QL STRIP: NEGATIVE
BUN SERPL-MCNC: 8 MG/DL (ref 6–20)
CALCIUM SERPL-MCNC: 9.7 MG/DL (ref 8.7–10.5)
CHLORIDE SERPL-SCNC: 103 MMOL/L (ref 95–110)
CHOLEST SERPL-MCNC: 129 MG/DL (ref 120–199)
CHOLEST/HDLC SERPL: 3 {RATIO} (ref 2–5)
CLARITY UR: CLEAR
CO2 SERPL-SCNC: 31 MMOL/L (ref 23–29)
COLOR UR: YELLOW
CREAT SERPL-MCNC: 0.8 MG/DL (ref 0.5–1.4)
DIFFERENTIAL METHOD: ABNORMAL
EOSINOPHIL # BLD AUTO: 0.1 K/UL (ref 0–0.5)
EOSINOPHIL NFR BLD: 1.1 % (ref 0–8)
ERYTHROCYTE [DISTWIDTH] IN BLOOD BY AUTOMATED COUNT: 15.8 % (ref 11.5–14.5)
EST. GFR  (AFRICAN AMERICAN): >60 ML/MIN/1.73 M^2
EST. GFR  (NON AFRICAN AMERICAN): >60 ML/MIN/1.73 M^2
ESTIMATED AVG GLUCOSE: 111 MG/DL (ref 68–131)
FOLATE SERPL-MCNC: 11.6 NG/ML (ref 4–24)
GLUCOSE SERPL-MCNC: 96 MG/DL (ref 70–110)
GLUCOSE UR QL STRIP: NEGATIVE
HBA1C MFR BLD: 5.5 % (ref 4–5.6)
HCT VFR BLD AUTO: 40.1 % (ref 37–48.5)
HDLC SERPL-MCNC: 43 MG/DL (ref 40–75)
HDLC SERPL: 33.3 % (ref 20–50)
HGB BLD-MCNC: 12.6 G/DL (ref 12–16)
HGB UR QL STRIP: NEGATIVE
IMM GRANULOCYTES # BLD AUTO: 0.01 K/UL (ref 0–0.04)
IMM GRANULOCYTES NFR BLD AUTO: 0.2 % (ref 0–0.5)
KETONES UR QL STRIP: NEGATIVE
LDLC SERPL CALC-MCNC: 71.6 MG/DL (ref 63–159)
LEUKOCYTE ESTERASE UR QL STRIP: NEGATIVE
LYMPHOCYTES # BLD AUTO: 2.5 K/UL (ref 1–4.8)
LYMPHOCYTES NFR BLD: 40.2 % (ref 18–48)
MCH RBC QN AUTO: 24.6 PG (ref 27–31)
MCHC RBC AUTO-ENTMCNC: 31.4 G/DL (ref 32–36)
MCV RBC AUTO: 78 FL (ref 82–98)
MONOCYTES # BLD AUTO: 0.4 K/UL (ref 0.3–1)
MONOCYTES NFR BLD: 6 % (ref 4–15)
NEUTROPHILS # BLD AUTO: 3.3 K/UL (ref 1.8–7.7)
NEUTROPHILS NFR BLD: 52.2 % (ref 38–73)
NITRITE UR QL STRIP: NEGATIVE
NONHDLC SERPL-MCNC: 86 MG/DL
NRBC BLD-RTO: 0 /100 WBC
PH UR STRIP: 7 [PH] (ref 5–8)
PLATELET # BLD AUTO: 303 K/UL (ref 150–450)
PMV BLD AUTO: 10.2 FL (ref 9.2–12.9)
POTASSIUM SERPL-SCNC: 4.3 MMOL/L (ref 3.5–5.1)
PROLACTIN SERPL IA-MCNC: 11 NG/ML (ref 5.2–26.5)
PROT SERPL-MCNC: 7.6 G/DL (ref 6–8.4)
PROT UR QL STRIP: NEGATIVE
RBC # BLD AUTO: 5.13 M/UL (ref 4–5.4)
SODIUM SERPL-SCNC: 139 MMOL/L (ref 136–145)
SP GR UR STRIP: 1.01 (ref 1–1.03)
T4 FREE SERPL-MCNC: 0.89 NG/DL (ref 0.71–1.51)
TRIGL SERPL-MCNC: 72 MG/DL (ref 30–150)
TSH SERPL DL<=0.005 MIU/L-ACNC: 4.07 UIU/ML (ref 0.4–4)
URN SPEC COLLECT METH UR: NORMAL
UROBILINOGEN UR STRIP-ACNC: NEGATIVE EU/DL
VIT B12 SERPL-MCNC: 385 PG/ML (ref 210–950)
WBC # BLD AUTO: 6.29 K/UL (ref 3.9–12.7)

## 2022-07-20 PROCEDURE — 86592 SYPHILIS TEST NON-TREP QUAL: CPT | Performed by: NURSE PRACTITIONER

## 2022-07-20 PROCEDURE — 83036 HEMOGLOBIN GLYCOSYLATED A1C: CPT | Performed by: NURSE PRACTITIONER

## 2022-07-20 PROCEDURE — 86803 HEPATITIS C AB TEST: CPT | Performed by: NURSE PRACTITIONER

## 2022-07-20 PROCEDURE — 82746 ASSAY OF FOLIC ACID SERUM: CPT | Performed by: NURSE PRACTITIONER

## 2022-07-20 PROCEDURE — 87389 HIV-1 AG W/HIV-1&-2 AB AG IA: CPT | Performed by: NURSE PRACTITIONER

## 2022-07-20 PROCEDURE — 80053 COMPREHEN METABOLIC PANEL: CPT | Performed by: NURSE PRACTITIONER

## 2022-07-20 PROCEDURE — 36415 COLL VENOUS BLD VENIPUNCTURE: CPT | Performed by: NURSE PRACTITIONER

## 2022-07-20 PROCEDURE — 81003 URINALYSIS AUTO W/O SCOPE: CPT | Performed by: NURSE PRACTITIONER

## 2022-07-20 PROCEDURE — 80061 LIPID PANEL: CPT | Performed by: NURSE PRACTITIONER

## 2022-07-20 PROCEDURE — 83498 ASY HYDROXYPROGESTERONE 17-D: CPT | Performed by: NURSE PRACTITIONER

## 2022-07-20 PROCEDURE — 84402 ASSAY OF FREE TESTOSTERONE: CPT | Performed by: NURSE PRACTITIONER

## 2022-07-20 PROCEDURE — 84439 ASSAY OF FREE THYROXINE: CPT | Performed by: NURSE PRACTITIONER

## 2022-07-20 PROCEDURE — 85025 COMPLETE CBC W/AUTO DIFF WBC: CPT | Performed by: NURSE PRACTITIONER

## 2022-07-20 PROCEDURE — 84146 ASSAY OF PROLACTIN: CPT | Performed by: NURSE PRACTITIONER

## 2022-07-20 PROCEDURE — 82607 VITAMIN B-12: CPT | Performed by: NURSE PRACTITIONER

## 2022-07-20 PROCEDURE — 84443 ASSAY THYROID STIM HORMONE: CPT | Performed by: NURSE PRACTITIONER

## 2022-07-21 LAB
HIV 1+2 AB+HIV1 P24 AG SERPL QL IA: NEGATIVE
RPR SER QL: NORMAL

## 2022-07-22 LAB
17OHP SERPL-MCNC: 129 NG/DL (ref 35–413)
HCV AB SERPL QL IA: NEGATIVE
TESTOST FREE SERPL-MCNC: 1.9 PG/ML

## 2022-08-10 ENCOUNTER — HOSPITAL ENCOUNTER (OUTPATIENT)
Dept: RADIOLOGY | Facility: HOSPITAL | Age: 22
Discharge: HOME | End: 2022-08-10
Attending: NURSE PRACTITIONER
Payer: MEDICAID

## 2022-08-10 DIAGNOSIS — Z87.42 HISTORY OF PCOS: ICD-10-CM

## 2022-08-10 DIAGNOSIS — R13.10 DYSPHAGIA, UNSPECIFIED TYPE: ICD-10-CM

## 2022-08-10 PROCEDURE — 76856 US PELVIS COMP WITH TRANSVAG NON-OB (XPD): ICD-10-PCS | Mod: 26,,, | Performed by: RADIOLOGY

## 2022-08-10 PROCEDURE — 76536 US SOFT TISSUE HEAD NECK THYROID: ICD-10-PCS | Mod: 26,,, | Performed by: RADIOLOGY

## 2022-08-10 PROCEDURE — 76830 TRANSVAGINAL US NON-OB: CPT | Mod: TC

## 2022-08-10 PROCEDURE — 76536 US EXAM OF HEAD AND NECK: CPT | Mod: 26,,, | Performed by: RADIOLOGY

## 2022-08-10 PROCEDURE — 76830 US PELVIS COMP WITH TRANSVAG NON-OB (XPD): ICD-10-PCS | Mod: 26,,, | Performed by: RADIOLOGY

## 2022-08-10 PROCEDURE — 76830 TRANSVAGINAL US NON-OB: CPT | Mod: 26,,, | Performed by: RADIOLOGY

## 2022-08-10 PROCEDURE — 76536 US EXAM OF HEAD AND NECK: CPT | Mod: TC

## 2022-08-10 PROCEDURE — 76856 US EXAM PELVIC COMPLETE: CPT | Mod: 26,,, | Performed by: RADIOLOGY

## 2022-11-08 ENCOUNTER — LAB VISIT (OUTPATIENT)
Dept: LAB | Facility: HOSPITAL | Age: 22
End: 2022-11-08
Attending: NURSE PRACTITIONER
Payer: MEDICAID

## 2022-11-08 DIAGNOSIS — J02.9 SORE THROAT: ICD-10-CM

## 2022-11-08 PROCEDURE — 87081 CULTURE SCREEN ONLY: CPT | Performed by: NURSE PRACTITIONER

## 2022-11-10 LAB — BACTERIA THROAT CULT: NORMAL

## 2023-03-01 ENCOUNTER — HOSPITAL ENCOUNTER (EMERGENCY)
Facility: HOSPITAL | Age: 23
Discharge: HOME | End: 2023-03-02
Attending: EMERGENCY MEDICINE
Payer: MEDICAID

## 2023-03-01 DIAGNOSIS — N89.8 VAGINAL IRRITATION: Primary | ICD-10-CM

## 2023-03-01 LAB
B-HCG UR QL: NEGATIVE
CTP QC/QA: YES

## 2023-03-01 PROCEDURE — 81025 URINE PREGNANCY TEST: CPT | Mod: ER | Performed by: EMERGENCY MEDICINE

## 2023-03-01 PROCEDURE — 99284 EMERGENCY DEPT VISIT MOD MDM: CPT | Mod: 25,ER

## 2023-03-02 VITALS
DIASTOLIC BLOOD PRESSURE: 66 MMHG | RESPIRATION RATE: 18 BRPM | TEMPERATURE: 98 F | SYSTOLIC BLOOD PRESSURE: 124 MMHG | HEART RATE: 81 BPM | OXYGEN SATURATION: 100 % | HEIGHT: 63 IN | BODY MASS INDEX: 48.73 KG/M2 | WEIGHT: 275 LBS

## 2023-03-02 RX ORDER — KETOROLAC TROMETHAMINE 10 MG/1
10 TABLET, FILM COATED ORAL EVERY 6 HOURS PRN
Qty: 12 TABLET | Refills: 0 | Status: SHIPPED | OUTPATIENT
Start: 2023-03-02 | End: 2023-03-05

## 2023-03-02 RX ORDER — FLUCONAZOLE 150 MG/1
150 TABLET ORAL ONCE
Qty: 2 TABLET | Refills: 0 | Status: SHIPPED | OUTPATIENT
Start: 2023-03-02 | End: 2023-03-02

## 2023-03-02 RX ORDER — MUPIROCIN 20 MG/G
OINTMENT TOPICAL 2 TIMES DAILY
Qty: 22 G | Refills: 0 | Status: SHIPPED | OUTPATIENT
Start: 2023-03-02 | End: 2023-03-12

## 2023-03-02 RX ORDER — METRONIDAZOLE 500 MG/1
500 TABLET ORAL EVERY 12 HOURS
Qty: 14 TABLET | Refills: 0 | Status: SHIPPED | OUTPATIENT
Start: 2023-03-02 | End: 2023-03-09

## 2023-03-02 NOTE — ED PROVIDER NOTES
"Encounter Date: 3/1/2023    SCRIBE #1 NOTE: I, Joann Dukes, am scribing for, and in the presence of,  Jaelyn Reyes MD. I have scribed the following portions of the note - Other sections scribed: HPI, ROS, PE.     History     Chief Complaint   Patient presents with    Vaginal Pain     PT REPORTS VAGINAL PAIN AFTER HAVING SEX 20 MIN AGO     Megan Gar is a 22 y.o. female, with PCOS, who presents to the ED with external vaginal pain and burning that began 1 hour ago. Patient reports the symptoms began after having sexual intercourse. Patient reports she self diagnosed a yeast infection and began using a cream (once a day for 3 days) 3 days ago. Patient reports after using the cream her symptoms of mild itching and white discharge resolved. Patient reports today is the first time she had intercourse since first using the cream. Patient states she has what looks like a brush burn on her vagina. Patient reports brown discharge before symptoms began. Patient also reports she "feels swollen". Patient denies fever or vaginal bleeding. Patient denies using any new products. Patient's LMP was 2/13/23.         The history is provided by the patient. No  was used.   Review of patient's allergies indicates:  No Known Allergies  Past Medical History:   Diagnosis Date    Obesity     PCOS (polycystic ovarian syndrome)      History reviewed. No pertinent surgical history.  Family History   Problem Relation Age of Onset    Cancer Neg Hx      Social History     Tobacco Use    Smoking status: Never    Smokeless tobacco: Never   Substance Use Topics    Alcohol use: Yes     Comment: OCC    Drug use: Not Currently     Frequency: 1.0 times per week     Review of Systems   Constitutional:  Negative for fever.   Gastrointestinal:  Negative for abdominal pain.   Genitourinary:  Positive for vaginal discharge and vaginal pain. Negative for dyspareunia, dysuria, frequency, genital sores, hematuria, pelvic " pain and vaginal bleeding.   All other systems reviewed and are negative.    Physical Exam     Initial Vitals [03/01/23 2345]   BP Pulse Resp Temp SpO2   134/89 101 20 98.3 °F (36.8 °C) 98 %      MAP       --         Physical Exam    Nursing note and vitals reviewed.  Constitutional: She appears well-developed and well-nourished.   HENT:   Head: Normocephalic and atraumatic.   Eyes: Conjunctivae are normal. Pupils are equal, round, and reactive to light.   Neck: Neck supple.   Normal range of motion.  Cardiovascular:  Normal rate and intact distal pulses.           Pulmonary/Chest: No respiratory distress.   Abdominal: She exhibits no distension. There is no abdominal tenderness.   Genitourinary: There is no rash, tenderness, lesion or injury on the right labia. There is tenderness on the left labia. There is no rash, lesion or injury on the left labia.    No vaginal discharge or bleeding.   No bleeding in the vagina.    No foreign body in the vagina.      No signs of injury in the vagina.      Genitourinary Comments:  exam chaperoned by nurse         Musculoskeletal:         General: No tenderness or edema. Normal range of motion.      Cervical back: Normal range of motion and neck supple.     Neurological: She is alert and oriented to person, place, and time.   Skin: Skin is warm and dry. No rash noted.   Psychiatric: She has a normal mood and affect. Her behavior is normal.       ED Course   Procedures  Labs Reviewed   POCT URINE PREGNANCY          Imaging Results    None          Medications - No data to display  Medical Decision Making:   History:   Old Medical Records: I decided to obtain old medical records.  Initial Assessment:   Megan Gar is a 22 y.o. female, with PCOS, who presents to the ED with external vaginal pain and burning that began 1 hour ago.  Differential Diagnosis:   Vulvovaginitis - bacterial, fungal, contact dermatitis, atopic dermatitis, others  Clinical Tests:   Lab Tests:  Ordered and Reviewed        Scribe Attestation:   Scribe #1: I performed the above scribed service and the documentation accurately describes the services I performed. I attest to the accuracy of the note.                 Labs Reviewed      Admission on 2023, Discharged on 2023   Component Date Value Ref Range Status    POC Preg Test, Ur 2023 Negative  Negative Final     Acceptable 2023 Yes   Final        Imaging Reviewed    Imaging Results    None         Medications given in ED    Medications - No data to display      Note was created using voice recognition software. Note may have occasional typographical errors that may not have been identified and edited despite good vanda initial review prior to signing.    I, Jaelyn Reyes MD, personally performed the services described in this documentation. All medical record entries made by the scribe were at my direction and in my presence.  I have reviewed the chart and agree that the record reflects my personal performance and is accurate and complete.      Clinical Impression:   Final diagnoses:  [N89.8] Vaginal irritation (Primary)        ED Disposition Condition    Discharge Stable          ED Prescriptions       Medication Sig Dispense Start Date End Date Auth. Provider    fluconazole (DIFLUCAN) 150 MG Tab () Take 1 tablet (150 mg total) by mouth once. for 1 dose 2 tablet 3/2/2023 3/2/2023 Jaelyn Reyes MD    mupirocin (BACTROBAN) 2 % ointment () Apply topically 2 (two) times daily. for 10 days 22 g 3/2/2023 3/12/2023 Jaelyn Reyes MD    ketorolac (TORADOL) 10 mg tablet () Take 1 tablet (10 mg total) by mouth every 6 (six) hours as needed for Pain (take with food). 12 tablet 3/2/2023 3/5/2023 Jaelyn Reyes MD    metroNIDAZOLE (FLAGYL) 500 MG tablet () Take 1 tablet (500 mg total) by mouth every 12 (twelve) hours. DO NOT DRINK ALCOHOL WHILE TAKING THIS MEDICATION for 7 days 14 tablet 3/2/2023  3/9/2023 Jaelyn Reyes MD          Follow-up Information       Follow up With Specialties Details Why Contact Info    The nearest emergency department.  Go to  As needed, If symptoms worsen     Your PCP  Call  As needed, for ongoing care     Your OBGYN  Call today to schedule an appointment, for re-evaluation of today's complaint for STD screening and treatment as indicated              Jaelyn Reyes MD  03/31/23 1240

## 2023-03-02 NOTE — DISCHARGE INSTRUCTIONS
Cleanse vaginal area using warm epsom salt soak, then pat dry and apply any barrier ointment/cream mixed with prescription Mupirocin.

## 2023-06-13 ENCOUNTER — PATIENT MESSAGE (OUTPATIENT)
Dept: RESEARCH | Facility: HOSPITAL | Age: 23
End: 2023-06-13
Payer: MEDICAID

## 2023-06-27 ENCOUNTER — PATIENT MESSAGE (OUTPATIENT)
Dept: RESEARCH | Facility: HOSPITAL | Age: 23
End: 2023-06-27
Payer: MEDICAID

## 2023-07-11 ENCOUNTER — PATIENT MESSAGE (OUTPATIENT)
Dept: RESEARCH | Facility: HOSPITAL | Age: 23
End: 2023-07-11
Payer: MEDICAID

## 2023-12-05 ENCOUNTER — OFFICE VISIT (OUTPATIENT)
Dept: OBSTETRICS AND GYNECOLOGY | Facility: CLINIC | Age: 23
End: 2023-12-05
Payer: MEDICAID

## 2023-12-05 ENCOUNTER — TELEPHONE (OUTPATIENT)
Dept: OBSTETRICS AND GYNECOLOGY | Facility: CLINIC | Age: 23
End: 2023-12-05

## 2023-12-05 VITALS
HEIGHT: 63 IN | SYSTOLIC BLOOD PRESSURE: 111 MMHG | WEIGHT: 266.75 LBS | DIASTOLIC BLOOD PRESSURE: 78 MMHG | BODY MASS INDEX: 47.27 KG/M2

## 2023-12-05 DIAGNOSIS — E28.2 PCOS (POLYCYSTIC OVARIAN SYNDROME): ICD-10-CM

## 2023-12-05 DIAGNOSIS — Z86.39 HISTORY OF HASHIMOTO THYROIDITIS: ICD-10-CM

## 2023-12-05 DIAGNOSIS — N97.0 INFERTILITY ASSOCIATED WITH ANOVULATION: Primary | ICD-10-CM

## 2023-12-05 DIAGNOSIS — E06.3 HASHIMOTO'S THYROIDITIS: ICD-10-CM

## 2023-12-05 PROCEDURE — 99203 OFFICE O/P NEW LOW 30 MIN: CPT | Mod: S$PBB,,, | Performed by: STUDENT IN AN ORGANIZED HEALTH CARE EDUCATION/TRAINING PROGRAM

## 2023-12-05 PROCEDURE — 1160F PR REVIEW ALL MEDS BY PRESCRIBER/CLIN PHARMACIST DOCUMENTED: ICD-10-PCS | Mod: CPTII,,, | Performed by: STUDENT IN AN ORGANIZED HEALTH CARE EDUCATION/TRAINING PROGRAM

## 2023-12-05 PROCEDURE — 3074F SYST BP LT 130 MM HG: CPT | Mod: CPTII,,, | Performed by: STUDENT IN AN ORGANIZED HEALTH CARE EDUCATION/TRAINING PROGRAM

## 2023-12-05 PROCEDURE — 1160F RVW MEDS BY RX/DR IN RCRD: CPT | Mod: CPTII,,, | Performed by: STUDENT IN AN ORGANIZED HEALTH CARE EDUCATION/TRAINING PROGRAM

## 2023-12-05 PROCEDURE — 3008F BODY MASS INDEX DOCD: CPT | Mod: CPTII,,, | Performed by: STUDENT IN AN ORGANIZED HEALTH CARE EDUCATION/TRAINING PROGRAM

## 2023-12-05 PROCEDURE — 99203 PR OFFICE/OUTPT VISIT, NEW, LEVL III, 30-44 MIN: ICD-10-PCS | Mod: S$PBB,,, | Performed by: STUDENT IN AN ORGANIZED HEALTH CARE EDUCATION/TRAINING PROGRAM

## 2023-12-05 PROCEDURE — 99999 PR PBB SHADOW E&M-EST. PATIENT-LVL III: CPT | Mod: PBBFAC,,, | Performed by: STUDENT IN AN ORGANIZED HEALTH CARE EDUCATION/TRAINING PROGRAM

## 2023-12-05 PROCEDURE — 3074F PR MOST RECENT SYSTOLIC BLOOD PRESSURE < 130 MM HG: ICD-10-PCS | Mod: CPTII,,, | Performed by: STUDENT IN AN ORGANIZED HEALTH CARE EDUCATION/TRAINING PROGRAM

## 2023-12-05 PROCEDURE — 1159F MED LIST DOCD IN RCRD: CPT | Mod: CPTII,,, | Performed by: STUDENT IN AN ORGANIZED HEALTH CARE EDUCATION/TRAINING PROGRAM

## 2023-12-05 PROCEDURE — 3008F PR BODY MASS INDEX (BMI) DOCUMENTED: ICD-10-PCS | Mod: CPTII,,, | Performed by: STUDENT IN AN ORGANIZED HEALTH CARE EDUCATION/TRAINING PROGRAM

## 2023-12-05 PROCEDURE — 99999 PR PBB SHADOW E&M-EST. PATIENT-LVL III: ICD-10-PCS | Mod: PBBFAC,,, | Performed by: STUDENT IN AN ORGANIZED HEALTH CARE EDUCATION/TRAINING PROGRAM

## 2023-12-05 PROCEDURE — 1159F PR MEDICATION LIST DOCUMENTED IN MEDICAL RECORD: ICD-10-PCS | Mod: CPTII,,, | Performed by: STUDENT IN AN ORGANIZED HEALTH CARE EDUCATION/TRAINING PROGRAM

## 2023-12-05 PROCEDURE — 99213 OFFICE O/P EST LOW 20 MIN: CPT | Mod: PBBFAC | Performed by: STUDENT IN AN ORGANIZED HEALTH CARE EDUCATION/TRAINING PROGRAM

## 2023-12-05 PROCEDURE — 3078F DIAST BP <80 MM HG: CPT | Mod: CPTII,,, | Performed by: STUDENT IN AN ORGANIZED HEALTH CARE EDUCATION/TRAINING PROGRAM

## 2023-12-05 PROCEDURE — 3078F PR MOST RECENT DIASTOLIC BLOOD PRESSURE < 80 MM HG: ICD-10-PCS | Mod: CPTII,,, | Performed by: STUDENT IN AN ORGANIZED HEALTH CARE EDUCATION/TRAINING PROGRAM

## 2023-12-05 RX ORDER — LETROZOLE 2.5 MG/1
TABLET, FILM COATED ORAL
Qty: 5 TABLET | Refills: 0 | Status: SHIPPED | OUTPATIENT
Start: 2023-12-05 | End: 2024-02-29

## 2023-12-05 RX ORDER — MEDROXYPROGESTERONE ACETATE 10 MG/1
10 TABLET ORAL 2 TIMES DAILY
Qty: 10 TABLET | Refills: 0 | Status: SHIPPED | OUTPATIENT
Start: 2023-12-05 | End: 2023-12-13

## 2023-12-05 NOTE — PROGRESS NOTES
Subjective:      Patient ID: Megan Gar is a 23 y.o. female.    Chief Complaint:  Absent Menses (Patient presents to discuss why her cycle is irregular. )      History of Present Illness  24 yo G0 presents to discuss infertility    Long history of anovulation and prior diagnosis of PCOS and Hashimoto's thyroiditis. Was previously taking metformin and synthroid but not currently.  Last attempted ovulation induction with a round of clomid and did not become pregnant, has not had a cycle since then in September. Reports progesterone testing after last round of clomid but progesterone level suggested she had not ovulated.     [place] [facility] [place] report not visible but pt states there were follicles noted      GYN & OB History  Patient's last menstrual period was 2023 (approximate).   Date of Last Pap: No result found    OB History    Para Term  AB Living   0 0 0 0 0 0   SAB IAB Ectopic Multiple Live Births   0 0 0 0 0       Review of Systems  Review of Systems   Genitourinary:  Positive for menstrual problem.          Objective:     Physical Exam:   Constitutional: She appears well-developed.    HENT:   Head: Normocephalic.    Eyes: Conjunctivae and EOM are normal.                     Musculoskeletal: Normal range of motion.       Neurological: She is alert.    Skin: Skin is warm and dry.    Psychiatric: She has a normal mood and affect.         Assessment:     1. Infertility associated with anovulation    2. PCOS (polycystic ovarian syndrome)    3. History of Hashimoto thyroiditis          Plan:     1. Infertility associated with anovulation  - Discussed trying ovulation induction with Letrozole.   - Recommend semen analysis of partner before any more invasive imaging/procedures   - Counseled on exercise and weight loss as important part of regulating cycles and improving fertility outcomes.   - medroxyPROGESTERone (PROVERA) 10 MG tablet; Take 1 tablet (10 mg total) by mouth 2 (two) times  a day.  Dispense: 10 tablet; Refill: 0  - letrozole (FEMARA) 2.5 mg Tab; Take 1 tablet by mouth starting day 3 of bleeding.  Dispense: 5 tablet; Refill: 0    2. PCOS (polycystic ovarian syndrome)  - Ambulatory referral/consult to Endocrinology; Future    3. History of Hashimoto thyroiditis  - Last thyroid labs drawn in June/July normal when pt was taking synthroid, counseled she may need to re-start meds but will make referral to endo for management   - Ambulatory referral/consult to Endocrinology; Future    Follow up after course of Provera     Lg Man III, MD  [facility] [place] - OB GYN   [address]  [place] [id number] [phone]

## 2023-12-05 NOTE — TELEPHONE ENCOUNTER
I called and spoke with patient to relay message. She expressed understanding and had no further questions.    JAVI Suggs  [phone]    ----- Message from Lg Man III, MD sent at 12/5/2023  3:03 PM CST -----  Regarding: Med instructions  Naesem Laughlin,  Can you call Ms Gar and let her know I sent meds to her pharmacy that we talked about.  She should take a pregnancy test before starting the provera. If neg, start taking the provera and wait for bleeding until taking letrozole.     If no bleeding occurs a week after provera is finished have her reach out to us    LM

## 2023-12-11 NOTE — PROGRESS NOTES
[facility]    Reason for visit: The primary encounter diagnosis was Irritable bowel syndrome with constipation. Diagnoses of Esophageal dysphagia and Class 3 severe obesity with body mass index (BMI) of 45.0 to 49.9 in adult, unspecified obesity type, unspecified whether serious comorbidity present were also pertinent to this visit.  Referring Provider/PCP: No, Primary Doctor    History of Present Illness:  Megan Gar is a 23 y.o. female with a history of PCOS, Hashimoto's thyroiditis, and infertility,  who is presenting for initial evaluation of chronic constipation and bloating.     Gets bloated and constipated with certain foods (pork, chicken, milk and milk products). Takes PRN Linzess 145 mcg & PRN Amitiza 8 mg for constipation - will often take both together only when she is constipated.  This causes diarrhea and her symptoms resolve. Also endorses liquids getting stuck in her throat when swallowing. Denies rectal bleeding, weight loss, nausea, vomiting, or odynophagia.     Was recently seen by OB/GYN for Letrozole induction for infertility. Saw Dr. Benjamin Ventura at [facility] in 2022 who recommended EGD & colonoscopy (see below).    PEndoHx:  Colonoscopy (12/21/2022): Nodularity in TI likely compatible with benign lymphoid hyperplasia. Grade 1 internal hemorrhoids.     EGD (12/21/2022): Reports unavailable, but unremarkable per patient. Gastric biopsies were negative for H pylori.     Review of Systems   Constitutional:  Negative for weight loss.   Gastrointestinal:  Positive for constipation. Negative for blood in stool, diarrhea, nausea and vomiting.        Bloating   Psychiatric/Behavioral:  Negative for substance abuse.          Review of patient's allergies indicates:  No Known Allergies    Physical Exam:  Constitutional: obese,  alert,  not in acute distress, and well developed  HENT: Head: Normal, normocephalic, atraumatic.  Eyes: conjunctiva clear and sclera nonicteric  GI:  soft, nondistended, tenderness mild in the lower abdomen, without guarding, and without rebound  Skin: normal color and no jaundice  Neurological: alert, oriented x3  speech normal in context and clarity  memory intact grossly  Psychiatric: oriented to time, place and person, mood and affect are within normal limits, pt is a good historian; no memory problems were noted    Laboratory:  Lab Results   Component Value Date     07/20/2022    K 4.3 07/20/2022     07/20/2022    CO2 31 (H) 07/20/2022    BUN 8 07/20/2022    CREATININE 0.8 07/20/2022    CALCIUM 9.7 07/20/2022    ANIONGAP 5 (L) 07/20/2022    ESTGFRAFRICA >60 07/20/2022    EGFRNONAA >60 07/20/2022       Lab Results   Component Value Date    ALT 14 07/20/2022    AST 15 07/20/2022    ALKPHOS 118 07/20/2022    BILITOT 0.4 07/20/2022    ALBUMIN 3.8 07/20/2022       Lab Results   Component Value Date    WBC 6.29 07/20/2022    HGB 12.6 07/20/2022    HCT 40.1 07/20/2022    MCV 78 (L) 07/20/2022     07/20/2022       Microbiology:  No Pertinent Microbiology    Imaging:  - US pelvis (8/10/22):  Multiple small follicles along the periphery of the left ovary.  Appearance could be within normal variation or related to the clinically suspected PCOS.     - CT abdomen and pelvis with contrast (5/17/22):  Mild inflammatory changes in the left lower quadrant adjacent to the sigmoid colon which is felt to most likely represent epiploic appendagitis.  Difficult to fully exclude potential sigmoid diverticulitis, however noting that no other diverticula are seen throughout the colon or region.     Assessment:  Megan Gar is a 23 y.o. female who is presenting for initial evaluation of chronic abdominal pain & bloating, which improves with having a BM. Constipated at baseline, taking Linzess and Amitiza only PRN. Has dysphagia to liquids only. No red flag symptoms. EGD & colonoscopy in 2022 unremarkable.      Problems:  IBS-C  Dysphagia (esophageal) to  liquids  Obesity (BMI>45)       Plan:  Will reduce Linzess to 72 mcg and advised patient to take it daily instead of PRN. Hold off on Amitiza for now.   Will refer to GI dietitian for low FODMAP diet.   For dysphagia to liquids - obtain esophageal manometry.   Advised patient to exercise 30 minutes daily, 5 days a week for weight loss.   Advised to follow up with OB/GYN for PCOS & infertility.   Follow up in about 4 months (around 4/13/2024).    Justen Kebede MD  Gastroenterology Fellow    Orders Placed This Encounter   Procedures    Ambulatory referral/consult to Nutrition Services

## 2023-12-13 ENCOUNTER — OFFICE VISIT (OUTPATIENT)
Dept: GASTROENTEROLOGY | Facility: CLINIC | Age: 23
End: 2023-12-13
Payer: MEDICAID

## 2023-12-13 ENCOUNTER — TELEPHONE (OUTPATIENT)
Dept: ENDOSCOPY | Facility: HOSPITAL | Age: 23
End: 2023-12-13
Payer: MEDICAID

## 2023-12-13 VITALS — HEIGHT: 63 IN | BODY MASS INDEX: 47.13 KG/M2 | WEIGHT: 266 LBS

## 2023-12-13 VITALS — BODY MASS INDEX: 48.95 KG/M2 | HEIGHT: 63 IN | WEIGHT: 276.25 LBS

## 2023-12-13 DIAGNOSIS — R13.19 ESOPHAGEAL DYSPHAGIA: ICD-10-CM

## 2023-12-13 DIAGNOSIS — E66.01 CLASS 3 SEVERE OBESITY WITH BODY MASS INDEX (BMI) OF 45.0 TO 49.9 IN ADULT, UNSPECIFIED OBESITY TYPE, UNSPECIFIED WHETHER SERIOUS COMORBIDITY PRESENT: ICD-10-CM

## 2023-12-13 DIAGNOSIS — K58.1 IRRITABLE BOWEL SYNDROME WITH CONSTIPATION: Primary | ICD-10-CM

## 2023-12-13 DIAGNOSIS — R13.10 DYSPHAGIA, UNSPECIFIED TYPE: Primary | ICD-10-CM

## 2023-12-13 PROCEDURE — 99999 PR PBB SHADOW E&M-EST. PATIENT-LVL III: CPT | Mod: PBBFAC,,, | Performed by: STUDENT IN AN ORGANIZED HEALTH CARE EDUCATION/TRAINING PROGRAM

## 2023-12-13 PROCEDURE — 99204 PR OFFICE/OUTPT VISIT, NEW, LEVL IV, 45-59 MIN: ICD-10-PCS | Mod: S$PBB,,, | Performed by: STUDENT IN AN ORGANIZED HEALTH CARE EDUCATION/TRAINING PROGRAM

## 2023-12-13 PROCEDURE — 99204 OFFICE O/P NEW MOD 45 MIN: CPT | Mod: S$PBB,,, | Performed by: STUDENT IN AN ORGANIZED HEALTH CARE EDUCATION/TRAINING PROGRAM

## 2023-12-13 PROCEDURE — 99999 PR PBB SHADOW E&M-EST. PATIENT-LVL III: ICD-10-PCS | Mod: PBBFAC,,, | Performed by: STUDENT IN AN ORGANIZED HEALTH CARE EDUCATION/TRAINING PROGRAM

## 2023-12-13 PROCEDURE — 3008F BODY MASS INDEX DOCD: CPT | Mod: CPTII,,, | Performed by: STUDENT IN AN ORGANIZED HEALTH CARE EDUCATION/TRAINING PROGRAM

## 2023-12-13 PROCEDURE — 99213 OFFICE O/P EST LOW 20 MIN: CPT | Mod: PBBFAC | Performed by: STUDENT IN AN ORGANIZED HEALTH CARE EDUCATION/TRAINING PROGRAM

## 2023-12-13 PROCEDURE — 3008F PR BODY MASS INDEX (BMI) DOCUMENTED: ICD-10-PCS | Mod: CPTII,,, | Performed by: STUDENT IN AN ORGANIZED HEALTH CARE EDUCATION/TRAINING PROGRAM

## 2023-12-13 NOTE — TELEPHONE ENCOUNTER
Spoke to Megan to schedule procedure(s) Esophageal Manometry       Physician to perform procedure(s) Dr. HANNAH Villafana  Date of Procedure (s) 12/21/23  Arrival Time 12:00 PM  Time of Procedure(s) 1:00 PM   Location of Procedure(s) [facility] [address] [facility]  Type of Rx Prep sent to patient: Other  Instructions provided to [facility] via [facility]    Patient was informed on the following information and verbalized understanding. Screening questionnaire reviewed with patient and complete. If procedure requires anesthesia, a responsible adult needs to be present to accompany the patient home, patient cannot drive after receiving anesthesia. Appointment details are tentative, especially check-in time. Patient will receive a prep-op call 7 days prior to confirm check-in time for procedure. If applicable the patient should contact their pharmacy to verify Rx for procedure prep is ready for pick-up. Patient was advised to call the scheduling department at [phone] if pharmacy states no Rx is available. Patient was advised to call the endoscopy scheduling department if any questions or concerns arise.       Endoscopy Scheduling Department

## 2023-12-13 NOTE — PATIENT INSTRUCTIONS
Reduced Linzess dose to 72 mcg - take this every morning with breakfast.     Schedule manometry.     Follow up with GI nutritionist.     Exercise 30 minutes daily, 5 days a week.      Follow up with me in 3-4 months.

## 2023-12-13 NOTE — PROGRESS NOTES
"GENERAL GI PATIENT INTAKE:    COVID symptoms in the last 7 days (runny nose, sore throat, congestion, cough, fever): No  PCP: No, Primary Doctor  If not PCP-  number given to establish [phone]: No    ALLERGIES REVIEWED:  Yes    CHIEF COMPLAINT:    Chief Complaint   Patient presents with    Abdominal Pain    Constipation       VITAL SIGNS:  Ht 5' 3" (1.6 m)   Wt 125.3 kg (276 lb 3.8 oz)   LMP 09/06/2023 (Approximate)   BMI 48.93 kg/m²      Change in medical, surgical, family or social history: No      REVIEWED MEDICATION LIST RECONCILED INCLUDING ABOVE MEDS:  Yes     "

## 2023-12-13 NOTE — TELEPHONE ENCOUNTER
"Justen Kebede MD  P [facility] Schedulers; Iker Senior MA  Caller: Unspecified (Today,  3:51 PM)  Procedure: Eso. manometry    Diagnosis: Dysphagia    Procedure Timin-4 weeks    *If within 4 weeks selected, please frances as high priority*    *If greater than 12 weeks, please select "5-12 weeks" and delay sending until 3 months prior to requested date*    Provider: N/A (Dr. Villafana and I will read)    Location: [address]    Additional Scheduling Information: No scheduling concerns    Prep Specifications:N/A    Is the patient taking a GLP-1 Agonist:no    Have you attached a patient to this message: yes  "

## 2023-12-13 NOTE — TELEPHONE ENCOUNTER
"----- Message from Justen Kebede MD sent at 2023  3:51 PM CST -----  Regarding: Esophageal manometry  Procedure: Eso. manometry    Diagnosis: Dysphagia    Procedure Timin-4 weeks    #If within 4 weeks selected, please frances as high priority#    #If greater than 12 weeks, please select "5-12 weeks" and delay sending until 3 months prior to requested date#     Provider: N/A (Dr. Villafana and I will read)    Location: [address]    Additional Scheduling Information: No scheduling concerns    Prep Specifications:N/A    Is the patient taking a GLP-1 Agonist:no    Have you attached a patient to this message: yes     "

## 2023-12-14 ENCOUNTER — PATIENT MESSAGE (OUTPATIENT)
Dept: GASTROENTEROLOGY | Facility: CLINIC | Age: 23
End: 2023-12-14
Payer: MEDICAID

## 2023-12-18 ENCOUNTER — TELEPHONE (OUTPATIENT)
Dept: ENDOSCOPY | Facility: HOSPITAL | Age: 23
End: 2023-12-18
Payer: MEDICAID

## 2023-12-18 NOTE — TELEPHONE ENCOUNTER
Spoke to Neha to schedule procedure(s) Esophageal Manometry       Physician to perform procedure(s) Dr. HANNAH Villafana  Date of Procedure (s) 02/02/24  Arrival Time 7:00 AM  Time of Procedure(s) 8:00 AM   Location of Procedure(s) [facility] [address] Floor  Type of Rx Prep sent to patient: Other  Instructions provided to patient via [facility]    Patient was informed on the following information and verbalized understanding. Screening questionnaire reviewed with patient and complete. If procedure requires anesthesia, a responsible adult needs to be present to accompany the patient home, patient cannot drive after receiving anesthesia. Appointment details are tentative, especially check-in time. Patient will receive a prep-op call 7 days prior to confirm check-in time for procedure. If applicable the patient should contact their pharmacy to verify Rx for procedure prep is ready for pick-up. Patient was advised to call the scheduling department at [phone] if pharmacy states no Rx is available. Patient was advised to call the endoscopy scheduling department if any questions or concerns arise.      [facility] Endoscopy Scheduling Department

## 2023-12-20 ENCOUNTER — TELEPHONE (OUTPATIENT)
Dept: OBSTETRICS AND GYNECOLOGY | Facility: CLINIC | Age: 23
End: 2023-12-20
Payer: MEDICAID

## 2023-12-20 NOTE — TELEPHONE ENCOUNTER
I called and spoke to patient re: her medication refill request. I relayed message from provider to confirm that patient is taking medication as directed. I discussed using a OPK to help track ovulation. She understands that if all is unseccessful, she will make a f/u appt to discuss next steps. She had no further questions.    JAVI Suggs  [phone]  
No
No

## 2023-12-21 DIAGNOSIS — N97.0 INFERTILITY ASSOCIATED WITH ANOVULATION: ICD-10-CM

## 2023-12-22 RX ORDER — LETROZOLE 2.5 MG/1
TABLET, FILM COATED ORAL
Qty: 5 TABLET | Refills: 0 | OUTPATIENT
Start: 2023-12-22

## 2023-12-22 NOTE — TELEPHONE ENCOUNTER
Refill Routing Note   Medication(s) are not appropriate for processing by [facility] for the following reason(s):        Outside of protocol    [facility] action(s):  Route               Appointments  past 12m or future 3m with PCP    Date Provider   Last Visit   12/5/2023 Lg Man III, MD   Next Visit   Visit date not found Lg Man III, MD   ED visits in past 90 days: 0        Note composed:6:36 PM 12/21/2023

## 2023-12-28 ENCOUNTER — TELEPHONE (OUTPATIENT)
Dept: OBSTETRICS AND GYNECOLOGY | Facility: CLINIC | Age: 23
End: 2023-12-28
Payer: MEDICAID

## 2023-12-28 NOTE — TELEPHONE ENCOUNTER
Pt call was returned. [facility]      ----- Message from Vanessa Argueta sent at 12/27/2023  3:58 PM CST -----  Regarding: [id number]  Type: Patient Call Back    Who called:    What is the request in detail: pt states her cycles came and she is currently taking the medication and she will like to know if the doctor wants her to completely     Can the clinic reply by [facility]?no     Would the patient rather a call back or a response via [facility]? Call back     Best call back number: [phone]       Additional Information:         weight loss with the diet and as much exercise as he can tolerate. Continue with physical therapy to improve the gait stability. Lab Results   Component Value Date    WBC 9.1 11/22/2022    HGB 13.1 (A) 11/22/2022    HCT 41.2 11/22/2022     11/22/2022    CHOL 124 06/04/2022    TRIG 109 06/04/2022    HDL 39 06/04/2022    ALT 27 06/04/2022    AST 32 06/04/2022     11/22/2022    K 4.6 11/22/2022     11/22/2022    CREATININE 1.10 11/22/2022    BUN 22 11/22/2022    CO2 29 11/22/2022    TSH 2.33 03/19/2022    INR 1.18 09/17/2022    LABA1C 6.2 11/22/2022    LABA1C 5.6 03/19/2022    LABA1C 5.7 10/16/2021       Return in about 6 months (around 5/18/2023) for Medication recheck. Subjective:      HPI:     HPI    OT has been helpful-- the hand movement is back to normal. Speech finding is a bit better-- still not great. Maybe a bit better. Still gets stuck at home. Still doing work sheets at home. Gait stability is better but is still working with the PT. Discharged from speech and OT but PT still recommended    BP looks good today--has had some dizziness. Seems like this is daily. Has not had any falls- feels like he could daily. Rarely checks the blood pressure at home. Still has a lot of the chronic pain-- all the joints really--     Has the history of Chrnoic anemia-- was recommended to have endoscopy-- has put this off. Has the neuro eval again in Dec.  60% decrease in the size of the hematoma at that eval.  Considering going back on the baby aspirin.       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 No pertinent family history. Social History     Tobacco Use    Smoking status: Former     Packs/day: 1.00     Years: 6.00     Pack years: 6.00     Types: Cigarettes     Start date: 10/4/1975     Quit date: 10/4/1981     Years since quittin.1    Smokeless tobacco: Never   Substance Use Topics    Alcohol use: Yes     Comment: 2 beers a day in the summer      Current Outpatient Medications   Medication Sig Dispense Refill    Semaglutide,0.25 or 0.5MG/DOS, 2 MG/1.5ML SOPN Inject 0.25 mg into the skin once a week 1 Adjustable Dose Pre-filled Pen Syringe 2    levothyroxine (SYNTHROID) 175 MCG tablet Take 175 mcg by mouth daily      atorvastatin (LIPITOR) 20 MG tablet Take 20 mg by mouth daily      albuterol sulfate HFA (PROVENTIL;VENTOLIN;PROAIR) 108 (90 Base) MCG/ACT inhaler Inhale 2 puffs into the lungs every 6 hours as needed for Wheezing      lisinopril (PRINIVIL;ZESTRIL) 20 MG tablet Take 1 tablet by mouth daily 30 tablet 0    furosemide (LASIX) 20 MG tablet Take 0.5 tablets by mouth daily 45 tablet 3    spironolactone (ALDACTONE) 25 MG tablet Take 0.5 tablets by mouth daily 45 tablet 3    bisoprolol (ZEBETA) 10 MG tablet TAKE 1 TABLET BY MOUTH TWICE DAILY 180 tablet 1    pantoprazole (PROTONIX) 40 MG tablet TAKE 1 TABLET BY MOUTH ONCE A DAY. 90 tablet 3    amLODIPine (NORVASC) 5 MG tablet Take 1 tablet by mouth daily 90 tablet 3    vitamin B-2 (RIBOFLAVIN) 100 MG TABS tablet   1     No current facility-administered medications for this visit.      Allergies   Allergen Reactions    Citalopram Anxiety       Health Maintenance   Topic Date Due    HIV screen  Never done    Shingles vaccine (1 of 2) Never done    DTaP/Tdap/Td vaccine (2 - Tdap) 2018    Colorectal Cancer Screen  2021    Pneumococcal 65+ years Vaccine (1 - PCV) Never done    AAA screen  07/13/2022    Flu vaccine (1) 08/01/2022    Lipids  09/18/2023    Depression Monitoring  09/26/2023    A1C test (Diabetic or Prediabetic)  11/22/2023    COVID-19 Vaccine  Completed    Hepatitis C screen  Completed    Hepatitis A vaccine  Aged Out    Hib vaccine  Aged Out    Meningococcal (ACWY) vaccine  Aged Out         Review of Systems    Objective:     /64   Pulse 70   Wt (!) 367 lb 3.2 oz (166.6 kg)   SpO2 96%   BMI 49.80 kg/m²     Physical Exam  Vitals and nursing note reviewed. Constitutional:       Appearance: Normal appearance. He is well-developed. He is obese. HENT:      Head: Normocephalic and atraumatic. Eyes:      Extraocular Movements: Extraocular movements intact. Conjunctiva/sclera: Conjunctivae normal.      Pupils: Pupils are equal, round, and reactive to light. Neck:      Thyroid: No thyromegaly. Vascular: No JVD. Cardiovascular:      Rate and Rhythm: Normal rate and regular rhythm. Heart sounds: Normal heart sounds. No murmur heard. No friction rub. No gallop. Comments: Heart sounds are somewhat distant  Pulmonary:      Effort: Pulmonary effort is normal. No respiratory distress. Breath sounds: Normal breath sounds. Abdominal:      Palpations: Abdomen is soft. Musculoskeletal:      Cervical back: Normal range of motion and neck supple. Right lower leg: Edema present. Left lower leg: Edema present. Comments: Trace lower extremity edema    Lymphadenopathy:      Cervical: No cervical adenopathy. Skin:     General: Skin is warm. Capillary Refill: Capillary refill takes less than 2 seconds. Neurological:      General: No focal deficit present. Mental Status: He is alert and oriented to person, place, and time. Cranial Nerves: No cranial nerve deficit. Sensory: No sensory deficit. Motor: No weakness.       Coordination: Coordination abnormal.      Gait: Gait normal.      Comments: Right arm and hand have normal strength, hand coordination on the right is improved. Speech fluidity is significantly improved. Occasional word finding difficulty   Psychiatric:         Mood and Affect: Mood normal.         Behavior: Behavior normal.         Thought Content: Thought content normal.         Judgment: Judgment normal.             Multiple labs and other testing may have been ordered which may not be completely evident from the above note due to system interface incompatibilities. Patient given educational materials - see patientinstructions. Discussed use, benefit, and side effects of prescribed medications. All patient questions answered. Pt voiced understanding. Reviewed health maintenance. Instructed to continue current medications, diet andexercise. Patient agreed with treatment plan. Follow up as directed.      (Please note that portions of this note were completed with a voice-recognition program. Efforts were made to edit the dictation but occasionally words are mis-transcribed.)    Electronically signed by Ritu Benson MD on 11/23/2022

## 2024-01-03 ENCOUNTER — NUTRITION (OUTPATIENT)
Dept: GASTROENTEROLOGY | Facility: CLINIC | Age: 24
End: 2024-01-03
Payer: MEDICAID

## 2024-01-03 DIAGNOSIS — Z71.9 ENCOUNTER FOR HEALTH EDUCATION: Primary | ICD-10-CM

## 2024-01-03 NOTE — PROGRESS NOTES
"Referring Provider:  Dr. Justen Kebede M.D.  Reason for Nutrition Referral: Low-FODMAPs Nutrition Therapy; Weight Management    Patient Information: Megan Gar 23 y.o.-year-old Black or African American female   Nutrition-related concerns: Pt presents for Low-FODMAPs diet education to reduce abdominal pain, bloating, and gas.   Pt also wishes to discuss weight management strategies         A = Nutrition Assessment  Anthropometric Data Estimated body mass index is 47.12 kg/m² as calculated from the following:    Height as of 12/13/23: 5' 3" (1.6 m).    Weight as of 12/13/23: 120.7 kg (266 lb).    Last 3 Weights:   Wt Readings from Last 3 Encounters:   12/13/23 120.7 kg (266 lb)   12/13/23 125.3 kg (276 lb 3.8 oz)   12/05/23 121 kg (266 lb 12.1 oz)      Biochemical Data Labs:   Lab Results   Component Value Date    YTBPAFRD28 385 07/20/2022     Lab Results   Component Value Date    HGB 12.6 07/20/2022    HCT 40.1 07/20/2022     Lab Results   Component Value Date    ALBUMIN 3.8 07/20/2022     Hemoglobin A1C   Date Value Ref Range Status   07/20/2022 5.5 4.0 - 5.6 % Final     Comment:     ADA Screening Guidelines:  5.7-6.4%  Consistent with prediabetes  >or=6.5%  Consistent with diabetes    High levels of fetal hemoglobin interfere with the HbA1C  assay. Heterozygous hemoglobin variants (HbS, HgC, etc)do  not significantly interfere with this assay.   However, presence of multiple variants may affect accuracy.     09/02/2016 5.6 4.5 - 6.2 % Final     Comment:     According to ADA guidelines, hemoglobin A1C <7.0% represents  optimal control in non-pregnant diabetic patients.  Different  metrics may apply to specific populations.   Standards of Medical Care in Diabetes - 2016.  For the purpose of screening for the presence of diabetes:  <5.7%     Consistent with the absence of diabetes  5.7-6.4%  Consistent with increasing risk for diabetes   (prediabetes)  >or=6.5%  Consistent with diabetes  Currently no " consensus exists for use of hemoglobin A1C  for diagnosis of diabetes for children.        Dietary Data  Appetite: Fair    Dietary Intake:  Breakfast:  Eats daily - grits, sausage, biscuit from cafeteria  Drinks water  Lunch:  Burger or cafeteria offerings, fries, water  Dinner:  Or fast food or cooked meal from home - baked fish, green beans or broccoli   Cranapple juice  Snacks: 0-1x/day  None  Occasionally eats fruit    Overall impression of dietary recall:   Pt relies on food sources outside of the home for 2/3 meals most days of the week. Pt does not consume many calorie-containing beverages as she drinks mostly water and occasionally some juice or a smoothie. Pt has a limited palate for vegetables and eats a limited amount of fruit. Pt avoids most animal meat, but eats seafood often.    Other Data:                        Food Allergies/intolerances: Bloating with pork, chicken, milk/milk products, avoids corn, peanuts, almonds  Dx: IBS with abdominal pain, bloating, and gas     D = Nutrition Diagnosis   Patient Assessment: Pt presents for Low-FODMAPs diet education to reduce symptoms of bloating, gas, and abdominal discomfort.     Patient knowledge of healthy eating was assessed to be fair. Session was spent educating patient on high-FODMAPs groups and Foods Recommended and Foods Not Recommended on a Low-FODMAP eating plan (examples given with handout). Also discussed  avoiding high gas-producing foods/beverages including animal dairy products, cruciferous vegetables, dried fruits, whole grains, legumes, fried foods, carbonated beverages (including beer), and artificial sweeteners. Also discussed balanced eating goals (ex. 4-5 servings of fruits/vegetables per day, sources of lean protein, heart-healthy fats) and methods to help incorporate these types of choices into her diet more frequently.    Reviewed in detail the Academy of Nutrition and Dietetics recommendations for IBS-C. Patient will consider a  low-FODMAP food trial in the future if needed, though her current reliance on foods made from outside the home is of concern for its implementation.     Patient verbalized understanding.  Provided RD contact information for concerns or questions as well as for guidance during FODMAPs trial period. Expect adequate compliance with dietary recommendations at this time.      Education Materials provided:   1. Academy of Nutrition and Dietetics: Irritable Bowel Syndrome Nutrition Therapy  2. [facility] [place]: Low-FODMAP Diet  3. Up-To-Date: Foods Most Commonly Associated with Gas and Bloating  4. [facility]: Food Shopping Guide  5. Academy of Nutrition and Dietetics: 1800-Calorie, 5-day menu plan       I = Nutrition Intervention  Recommendation #1 Recommend strict avoidance of all high-FODMAP foods/beverages for up to 6 weeks or until remission of GI symptoms. Once GI symptoms improve, then recommend a FODMAPs trial period (under RD supervision) of specific high-FODMAPs foods/food groups to expand diet and to determine tolerance levels      Recommendation #2  Decrease consumption of commonly associated gas-producing foods and beverages as reviewed during session   Recommendation #3  Aim for 5-6 small meals and snacks per day and avoid skipping meals   Recommendation #4 Create nutrient-dense meals and snacks. Focus on adequate nutrition including lean proteins, whole grains/fiber, and increasing overall fruit/vegetable intake.    Recommendation #5 Eat small, balanced meals and snacks every 3-4 hours. Include a source of protein with all meals and snacks to optimize satiation and increase metabolism. Examples of protein sources provided during consult.     M = Nutrition Monitoring   Indicator 1. Diet recall    Indicator 2. GI symptoms      E= Nutrition Evaluation   Goal 1. Diet recall shows good compliance with recommendations reviewed during session    Goal 2. GI symptoms shows trend towards goal of reduced  severity and frequency       Consultation Time: 55 Minutes  Follow Up: Patient provided with Dietitian contact number and advised to call or make future appointment if further consultation is needed/desired.    Communication with provider via Epic  Signature: Yolande Isabel, MPH, RDN, LDN

## 2024-01-29 ENCOUNTER — PATIENT MESSAGE (OUTPATIENT)
Dept: GASTROENTEROLOGY | Facility: CLINIC | Age: 24
End: 2024-01-29
Payer: MEDICAID

## 2024-01-29 DIAGNOSIS — E66.01 OBESITIES, MORBID: Primary | ICD-10-CM

## 2024-01-30 ENCOUNTER — TELEPHONE (OUTPATIENT)
Dept: BARIATRICS | Facility: CLINIC | Age: 24
End: 2024-01-30
Payer: MEDICAID

## 2024-01-30 NOTE — TELEPHONE ENCOUNTER
----- Message from Dia Hahn RN sent at 2024 12:08 PM CST -----  Regarding: FW: No availability  Contact: Pt @[phone]  Please return this pts  call . Thank you  ----- Message -----  From: Mervat Villatoroangelito  Sent: 2024  11:40 AM CST  To: [facility] Clinical Staff  Subject: No availability                                  Pt called in to schedule an appt; no available appts in Epic. Pt is asking for a call back soon to schedule. Thanks.         Reason appt not schedule: no availability          Patient's DX:E66.01 (ICD-10-CM) - Obesities, morbid         Patient requesting call back or [facility] [phone]

## 2024-01-30 NOTE — TELEPHONE ENCOUNTER
Informed patient that [facility] does not except medicaid, assist with providing contact information to university etc.

## 2024-02-01 ENCOUNTER — TELEPHONE (OUTPATIENT)
Dept: GASTROENTEROLOGY | Facility: CLINIC | Age: 24
End: 2024-02-01
Payer: MEDICAID

## 2024-02-01 NOTE — TELEPHONE ENCOUNTER
Spoke with patient.   She is aware an external referral has been placed by  to bariatrics at [facility].  She is aware that referral was sent to a pool that handles the medicaid referrals.   She stated she has already been in contact with Geri and has an appointment.  Nikole

## 2024-02-01 NOTE — TELEPHONE ENCOUNTER
----- Message from Justen Kebede MD sent at 1/31/2024 10:46 AM CST -----  Regarding: External bariatric referral  Hi Ms. Agustin,     Her insurance denied an internal referral within [facility]. I have placed an External bariatric referral. Providers at [facility] - Dr. Jonathon Mock, Dr. Toni Espinal. Ph [phone]    Thanks!

## 2024-02-14 ENCOUNTER — PATIENT MESSAGE (OUTPATIENT)
Dept: OBSTETRICS AND GYNECOLOGY | Facility: CLINIC | Age: 24
End: 2024-02-14
Payer: MEDICAID

## 2024-02-22 ENCOUNTER — TELEPHONE (OUTPATIENT)
Dept: GASTROENTEROLOGY | Facility: CLINIC | Age: 24
End: 2024-02-22
Payer: MEDICAID

## 2024-02-26 ENCOUNTER — PATIENT MESSAGE (OUTPATIENT)
Dept: OBSTETRICS AND GYNECOLOGY | Facility: CLINIC | Age: 24
End: 2024-02-26

## 2024-02-26 ENCOUNTER — OFFICE VISIT (OUTPATIENT)
Dept: OBSTETRICS AND GYNECOLOGY | Facility: CLINIC | Age: 24
End: 2024-02-26
Payer: MEDICAID

## 2024-02-26 VITALS
DIASTOLIC BLOOD PRESSURE: 73 MMHG | HEIGHT: 63 IN | WEIGHT: 279.13 LBS | BODY MASS INDEX: 49.46 KG/M2 | SYSTOLIC BLOOD PRESSURE: 120 MMHG

## 2024-02-26 DIAGNOSIS — N97.0 INFERTILITY ASSOCIATED WITH ANOVULATION: Primary | ICD-10-CM

## 2024-02-26 PROCEDURE — 99213 OFFICE O/P EST LOW 20 MIN: CPT | Mod: S$PBB,,, | Performed by: STUDENT IN AN ORGANIZED HEALTH CARE EDUCATION/TRAINING PROGRAM

## 2024-02-26 PROCEDURE — 99999 PR PBB SHADOW E&M-EST. PATIENT-LVL II: CPT | Mod: PBBFAC,,, | Performed by: STUDENT IN AN ORGANIZED HEALTH CARE EDUCATION/TRAINING PROGRAM

## 2024-02-26 PROCEDURE — 3008F BODY MASS INDEX DOCD: CPT | Mod: CPTII,,, | Performed by: STUDENT IN AN ORGANIZED HEALTH CARE EDUCATION/TRAINING PROGRAM

## 2024-02-26 PROCEDURE — 99212 OFFICE O/P EST SF 10 MIN: CPT | Mod: PBBFAC | Performed by: STUDENT IN AN ORGANIZED HEALTH CARE EDUCATION/TRAINING PROGRAM

## 2024-02-26 PROCEDURE — 1159F MED LIST DOCD IN RCRD: CPT | Mod: CPTII,,, | Performed by: STUDENT IN AN ORGANIZED HEALTH CARE EDUCATION/TRAINING PROGRAM

## 2024-02-26 PROCEDURE — 3078F DIAST BP <80 MM HG: CPT | Mod: CPTII,,, | Performed by: STUDENT IN AN ORGANIZED HEALTH CARE EDUCATION/TRAINING PROGRAM

## 2024-02-26 PROCEDURE — 3074F SYST BP LT 130 MM HG: CPT | Mod: CPTII,,, | Performed by: STUDENT IN AN ORGANIZED HEALTH CARE EDUCATION/TRAINING PROGRAM

## 2024-02-26 PROCEDURE — 1160F RVW MEDS BY RX/DR IN RCRD: CPT | Mod: CPTII,,, | Performed by: STUDENT IN AN ORGANIZED HEALTH CARE EDUCATION/TRAINING PROGRAM

## 2024-02-26 NOTE — PROGRESS NOTES
Subjective:      Patient ID: Megan Gar is a 23 y.o. female.    Chief Complaint:  Infertility (F/u, she feels like femara did not help her ovulate.)      History of Present Illness  24 yo G0 presents to discuss infertility    Had withdrawal bleeding after provera and tried letrozole x2 months at 2.5mg dose. LMP 24  Feels like in Dec she had a change in discharge that makes her think she did ovulate, but did not use OPKs    Has not had partner get semen analysis done yet, will plan to do so soon. Made appointments with Endo and bariatrics to work on weight loss          GYN & OB History  Patient's last menstrual period was 2024 (exact date).   Date of Last Pap: No result found    OB History    Para Term  AB Living   0 0 0 0 0 0   SAB IAB Ectopic Multiple Live Births   0 0 0 0 0       Review of Systems  Review of Systems   All other systems reviewed and are negative.         Objective:     Physical Exam:   Constitutional: She is oriented to person, place, and time. She appears well-developed.    HENT:   Head: Normocephalic and atraumatic.    Eyes: Conjunctivae and EOM are normal.      Pulmonary/Chest: Effort normal.                  Musculoskeletal: Normal range of motion.       Neurological: She is alert and oriented to person, place, and time.    Skin: Skin is warm and dry.    Psychiatric: She has a normal mood and affect.         Assessment:     1. Infertility associated with anovulation       Plan:     1. Infertility associated with anovulation  - Reviewed interval changes since last appointment and congratulated on progress towards weight loss  - Will send Rx for higher dose letrozole once patient confirms her period which should start within the week  - Encouraged OPK use to document ovulation and having partner pursue semen analysis  - Had US done last  at [facility] but report unavailable in [facility]where     Follow up in 3 months to assess cycles and WWE with brandon Castanon  Donovan MOODY MD  [facility] [place] - OB GYN   [address]  [facility] [id number] [phone]

## 2024-02-29 ENCOUNTER — TELEPHONE (OUTPATIENT)
Dept: OBSTETRICS AND GYNECOLOGY | Facility: CLINIC | Age: 24
End: 2024-02-29
Payer: MEDICAID

## 2024-02-29 DIAGNOSIS — N97.0 INFERTILITY ASSOCIATED WITH ANOVULATION: ICD-10-CM

## 2024-02-29 RX ORDER — LETROZOLE 2.5 MG/1
5 TABLET, FILM COATED ORAL DAILY
Qty: 10 TABLET | Refills: 1 | Status: SHIPPED | OUTPATIENT
Start: 2024-02-29 | End: 2024-03-10

## 2024-03-14 ENCOUNTER — OFFICE VISIT (OUTPATIENT)
Dept: ENDOCRINOLOGY | Facility: CLINIC | Age: 24
End: 2024-03-14
Payer: MEDICAID

## 2024-03-14 VITALS
DIASTOLIC BLOOD PRESSURE: 80 MMHG | BODY MASS INDEX: 48.95 KG/M2 | WEIGHT: 276.25 LBS | HEIGHT: 63 IN | SYSTOLIC BLOOD PRESSURE: 118 MMHG

## 2024-03-14 DIAGNOSIS — E06.3 HASHIMOTO'S THYROIDITIS: Primary | ICD-10-CM

## 2024-03-14 DIAGNOSIS — E66.01 CLASS 3 SEVERE OBESITY WITH SERIOUS COMORBIDITY AND BODY MASS INDEX (BMI) OF 45.0 TO 49.9 IN ADULT, UNSPECIFIED OBESITY TYPE: ICD-10-CM

## 2024-03-14 DIAGNOSIS — R06.83 SNORING: ICD-10-CM

## 2024-03-14 DIAGNOSIS — E28.2 PCOS (POLYCYSTIC OVARIAN SYNDROME): ICD-10-CM

## 2024-03-14 PROCEDURE — 3074F SYST BP LT 130 MM HG: CPT | Mod: CPTII,,, | Performed by: INTERNAL MEDICINE

## 2024-03-14 PROCEDURE — 1159F MED LIST DOCD IN RCRD: CPT | Mod: CPTII,,, | Performed by: INTERNAL MEDICINE

## 2024-03-14 PROCEDURE — 99204 OFFICE O/P NEW MOD 45 MIN: CPT | Mod: S$PBB,,, | Performed by: INTERNAL MEDICINE

## 2024-03-14 PROCEDURE — G2211 COMPLEX E/M VISIT ADD ON: HCPCS | Mod: S$PBB,,, | Performed by: INTERNAL MEDICINE

## 2024-03-14 PROCEDURE — 3008F BODY MASS INDEX DOCD: CPT | Mod: CPTII,,, | Performed by: INTERNAL MEDICINE

## 2024-03-14 PROCEDURE — 99213 OFFICE O/P EST LOW 20 MIN: CPT | Mod: PBBFAC | Performed by: INTERNAL MEDICINE

## 2024-03-14 PROCEDURE — 3079F DIAST BP 80-89 MM HG: CPT | Mod: CPTII,,, | Performed by: INTERNAL MEDICINE

## 2024-03-14 PROCEDURE — 99999 PR PBB SHADOW E&M-EST. PATIENT-LVL III: CPT | Mod: PBBFAC,,, | Performed by: INTERNAL MEDICINE

## 2024-03-14 RX ORDER — DEXAMETHASONE 1 MG/1
1 TABLET ORAL ONCE
Qty: 1 TABLET | Refills: 0 | Status: SHIPPED | OUTPATIENT
Start: 2024-03-14 | End: 2024-03-27

## 2024-03-14 NOTE — ASSESSMENT & PLAN NOTE
Continue to work on lifestyle modification.  Based on labs may benefit from metformin which can sometimes result in a modest weight reduction.

## 2024-03-14 NOTE — ASSESSMENT & PLAN NOTE
Reports she is actively trying for pregnancy but is also working on weight reduction and will be establishing care in a weight management clinic.  Discussed timeline for becoming pregnant and she is willing to hold off for about a year and focus on weight management before continuing to actively conceive.  We reviewed that many of the medications that are used for weight management like semaglutide should not be used when you are trying to conceive or when you are pregnant.  Recommend against using compounded medications.      Previous workup consistent with PCOS with history of elevated testosterone, irregular menstrual cycles, hirsutism, cystic ovaries per chart review with normal 17 hydroxy progesterone and prolactin.    Will complete workup with low-dose dexamethasone suppression test to rule out hypercortisolism although my clinical suspicion is low.    Will also proceed with testing to evaluate for insulin resistance as she may benefit from GLP-1 receptor agonist (if she is agreeable to holding off on pregnancy) or metformin.

## 2024-03-14 NOTE — ASSESSMENT & PLAN NOTE
Currently on low-dose of levothyroxine 50 mcg daily with known history of Hashimoto's disease and elevated TPO antibodies.  Will recheck TSH now and if needed adjust levothyroxine to keep TSH in the lower half of the normal range.

## 2024-03-14 NOTE — PATIENT INSTRUCTIONS
dexamethasone suppression test:  Please take 1 mg tablet of dexamethasone at it exactly 11:00 p.m. the night before your 8:00 a.m. blood test is scheduled.  Have blood drawn exactly at 8:00 a.m. as the timings of the medication and blood draw are very important.    I have sent a prescription for 1 mg of dexamethasone (one dose) to your pharmacy.  A normal response to taking the dexamethasone is a low cortisol level the next day. Do not be alarmed if your cortisol level is marked as abnormally low because that is a normal response.

## 2024-03-14 NOTE — PROGRESS NOTES
ENDOCRINOLOGY INITIAL VISIT  03/14/2024    Reason for Visit:  referred by Self, [facility]erral for evaluation of hypothyroidism and pcos     HPI:  Megan Gar is a 23 y.o. female with hx of PCOS, hypothyroidism, preDM in the past.    With regards to hypothyroidism:    Patient was found to have hypothyroidism around 2022 on routine labs .  On chart review has had elevated TSH in the setting of low FT4 prior to starting levothyroxine.  She was initially started on levothyroxine 25 mcg daily then increase to 50 mcg daily .  She denies any family history of thyroid disease or autoimmune conditions.    Etiology determine to be: Hashimoto's    Thyroid Antibodies: elevated TPO Ab      Current medication:  generic lt4 50 mcg daily     Takes thyroid medication properly without food first thing in the morning and is waiting at least 30-60 minutes to eat.     Not on any Ca, iron, multivitamins, acid reflux medications, or supplements. Knows to separate from levothyroxine by 4H.     Thyroid Symptoms  + fatigue  + snoring, no witnessed apnea    Weight change:  [x]  Gain []  Loss  []  Denies   More active and following healthy diet   No sugar sweetened beverages   Up 20 lbs in the past 6 month(s)     Temperature intolerance:  []  Cold [x]  Hot   []  Denies     GI:  []  Hyperdefication [x]  Constipation (chronic) []  Denies    Integument:  []  Hair loss []  Dry skin  [x]  Denies    Other:  []  Palpitation []  tremor     []  Increased anxiety    [x]  Denies        Cushing's syndrome   Symptoms:   []  Easy bruising [x]  Weight gain  []  Worse glycemic control     []  New/worse HTN []  Acne  []  Hirsutism    []  Striae  [x]  Menstrual change []  VTE     []  Fractures  []  Plethora  []  Proximal muscle weakness     []  Denies all      PCOS :  Diagnosed around 2017    Menstrual History/Obstetric:   Menarche: 9 or 10- initially regular until high school then with oligomenorrhea  Frequency of cycle: now monthly bleed since  "12/2023  OCP use: OCP in high school then mirena from [id number]  Obstetric history: actively trying -not on prenatal vitamin    Hyperandrogenism Symptoms:     Hirsutism: + neck and lower abd  Cosmetic measures:  waxing  Acne: occasional  Scalp hair loss:  some hair loss    Androgen Labs:   4/2023 elevated free testosterone of 9 and normal 17OHP  No results found for: "TOTALTESTOST"  Lab Results   Component Value Date    TESTOSTERONE 1.9 07/20/2022     No results found for: "DHEASLCMS", "DHEASO4"    Lipid panel  Lab Results   Component Value Date    CHOL 129 07/20/2022    TRIG 72 07/20/2022    HDL 43 07/20/2022    LDLCALC 71.6 07/20/2022    CHOLHDL 33.3 07/20/2022      Glucose/OGTT:   Not done   She reports having labs consistent with prediabetes in the past and was prescribed metformin which she took for awhile without side effects but then eventually discontinued.  She is planning to establish care at a weight management clinic.  Lab Results   Component Value Date    HGBA1C 5.5 07/20/2022      Imaging:   Ultrasound: hx of cystic ovaries      Review of patient's allergies indicates:  No Known Allergies      Current Outpatient Medications:     cetirizine (ZYRTEC) 10 MG tablet, Take 1 tablet (10 mg total) by mouth every evening., Disp: 30 tablet, Rfl: 3    levothyroxine (SYNTHROID) 25 MCG tablet, Take 1 tablet (25 mcg total) by mouth before breakfast., Disp: 30 tablet, Rfl: 11    linaCLOtide (LINZESS) 72 mcg Cap capsule, Take 1 capsule (72 mcg total) by mouth before breakfast., Disp: 90 capsule, Rfl: 0      ROS: see HPI       PHYSICAL EXAM  LMP 01/24/2024 (Exact Date)   Wt Readings from Last 3 Encounters:   02/26/24 126.6 kg (279 lb 1.6 oz)   12/13/23 120.7 kg (266 lb)   12/13/23 125.3 kg (276 lb 3.8 oz)   ]      IMAGING STUDIES    ASSESSMENT/PLAN    Problem List Items Addressed This Visit       Hashimoto's thyroiditis - Primary       Currently on low-dose of levothyroxine 50 mcg daily with known history of " Hashimoto's disease and elevated TPO antibodies.  Will recheck TSH now and if needed adjust levothyroxine to keep TSH in the lower half of the normal range.         Relevant Orders    TSH    T4, Free    Obesity       Continue to work on lifestyle modification.  Based on labs may benefit from metformin which can sometimes result in a modest weight reduction.         PCOS (polycystic ovarian syndrome)       Reports she is actively trying for pregnancy but is also working on weight reduction and will be establishing care in a weight management clinic.  Discussed timeline for becoming pregnant and she is willing to hold off for about a year and focus on weight management before continuing to actively conceive.  We reviewed that many of the medications that are used for weight management like semaglutide should not be used when you are trying to conceive or when you are pregnant.  Recommend against using compounded medications.      Previous workup consistent with PCOS with history of elevated testosterone, irregular menstrual cycles, hirsutism, cystic ovaries per chart review with normal 17 hydroxy progesterone and prolactin.    Will complete workup with low-dose dexamethasone suppression test to rule out hypercortisolism although my clinical suspicion is low.    Will also proceed with testing to evaluate for insulin resistance as she may benefit from GLP-1 receptor agonist (if she is agreeable to holding off on pregnancy) or metformin.         Relevant Medications    dexAMETHasone (DECADRON) 1 MG Tab    Other Relevant Orders    Cortisol, 8AM    Dexamethasone    Hemoglobin A1C    GLUCOSE TOLERANCE 2 HOUR    Basic Metabolic Panel    Insulin, random    Snoring       Concern for obstructive sleep apnea, sleep medicine referral placed         Relevant Orders    Ambulatory referral/consult to Sleep Disorders     RTC in 3-4 month(s) with Dr. Soto PCOS clinic  8 am fasting lab in the next couple week.     Ernie Mace,  MD

## 2024-03-27 DIAGNOSIS — E28.2 PCOS (POLYCYSTIC OVARIAN SYNDROME): Primary | ICD-10-CM

## 2024-03-27 RX ORDER — DEXAMETHASONE 1 MG/1
TABLET ORAL
Qty: 1 TABLET | Refills: 0 | Status: SHIPPED | OUTPATIENT
Start: 2024-03-27

## 2024-08-30 ENCOUNTER — OFFICE VISIT (OUTPATIENT)
Dept: OBSTETRICS AND GYNECOLOGY | Facility: CLINIC | Age: 24
End: 2024-08-30
Payer: COMMERCIAL

## 2024-08-30 VITALS
BODY MASS INDEX: 47.42 KG/M2 | SYSTOLIC BLOOD PRESSURE: 117 MMHG | HEIGHT: 63 IN | DIASTOLIC BLOOD PRESSURE: 74 MMHG | WEIGHT: 267.63 LBS

## 2024-08-30 DIAGNOSIS — N93.9 ABNORMAL UTERINE BLEEDING (AUB): Primary | ICD-10-CM

## 2024-08-30 PROCEDURE — 99999 PR PBB SHADOW E&M-EST. PATIENT-LVL III: CPT | Mod: PBBFAC,,, | Performed by: STUDENT IN AN ORGANIZED HEALTH CARE EDUCATION/TRAINING PROGRAM

## 2024-08-30 RX ORDER — MEDROXYPROGESTERONE ACETATE 10 MG/1
TABLET ORAL
Qty: 10 TABLET | Refills: 3 | Status: SHIPPED | OUTPATIENT
Start: 2024-08-30

## 2024-08-30 RX ORDER — LEVOTHYROXINE SODIUM 50 UG/1
1 TABLET ORAL EVERY MORNING
COMMUNITY
Start: 2024-07-15 | End: 2025-07-15

## 2024-08-30 NOTE — PROGRESS NOTES
Subjective     Patient ID: Megan Gar is a 23 y.o. female.    Chief Complaint:  Dysmenorrhea (And irregular cycles. LMP on  and is still bleeding currently.)      History of Present Illness  22 yo G0 presents to discuss AUB    Periods had been more regular this winter without any medication but at the start of summer periods stopped for May-July until  and has been going on since then    Started taking mounjaro but has gotten too expensive and has not continued. Still hovering around the same weight.   Was diagnosed with Hashimoto's and started on synthroid but still feels fatigued/cold. Has not had labs ordered by endocrine drawn yet, plans to do the non-fasting/timed labs today    Does not desire to get on birth control at this time, just would like to have more regulated bleeding.   Has an appointment with bariatrics soon but will be changing insurance and may need to cancel        GYN & OB History  Patient's last menstrual period was 2024 (exact date).   Date of Last Pap: No result found    OB History    Para Term  AB Living   0 0 0 0 0 0   SAB IAB Ectopic Multiple Live Births   0 0 0 0 0       Review of Systems  Review of Systems   Genitourinary:  Positive for vaginal bleeding.          Objective   Physical Exam:   Constitutional: She is oriented to person, place, and time. She appears well-developed and well-nourished.    HENT:   Head: Normocephalic and atraumatic.    Eyes: Conjunctivae and EOM are normal.      Pulmonary/Chest: Effort normal.                  Musculoskeletal: Normal range of motion.       Neurological: She is alert and oriented to person, place, and time.    Skin: Skin is warm and dry.    Psychiatric: She has a normal mood and affect.            Assessment and Plan     1. Abnormal uterine bleeding (AUB)       Plan:  1. Abnormal uterine bleeding (AUB)  - discussed use of progesterone for withdrawal bleed q3 months for endometrial protection  - Encouraged to  follow up with endocrine after lab draw in event thyroid meds need adjustment/she is diagnosed with insulin resistance/prediabetes and can get back on mounjaro  - medroxyPROGESTERone (PROVERA) 10 MG tablet; Take 1 tablet daily (10mg) for 10 days  Dispense: 10 tablet; Refill: 3      Follow up as needed/when ready to re-start trying for pregnancy     Lg Man III, MD  [facility] - OB GYN   [address]  [facility] [id number] [phone]

## 2024-08-31 ENCOUNTER — PATIENT MESSAGE (OUTPATIENT)
Dept: GASTROENTEROLOGY | Facility: CLINIC | Age: 24
End: 2024-08-31
Payer: COMMERCIAL

## 2024-09-13 ENCOUNTER — PATIENT MESSAGE (OUTPATIENT)
Dept: OBSTETRICS AND GYNECOLOGY | Facility: CLINIC | Age: 24
End: 2024-09-13
Payer: COMMERCIAL

## 2024-09-14 ENCOUNTER — HOSPITAL ENCOUNTER (EMERGENCY)
Facility: HOSPITAL | Age: 24
Discharge: HOME | End: 2024-09-14
Attending: EMERGENCY MEDICINE
Payer: COMMERCIAL

## 2024-09-14 VITALS
WEIGHT: 270 LBS | OXYGEN SATURATION: 98 % | HEIGHT: 63 IN | TEMPERATURE: 98 F | SYSTOLIC BLOOD PRESSURE: 127 MMHG | BODY MASS INDEX: 47.84 KG/M2 | HEART RATE: 92 BPM | DIASTOLIC BLOOD PRESSURE: 83 MMHG | RESPIRATION RATE: 18 BRPM

## 2024-09-14 DIAGNOSIS — N93.9 ABNORMAL UTERINE BLEEDING (AUB): Primary | ICD-10-CM

## 2024-09-14 DIAGNOSIS — E28.2 PCOS (POLYCYSTIC OVARIAN SYNDROME): ICD-10-CM

## 2024-09-14 DIAGNOSIS — N92.0 MENORRHAGIA WITH REGULAR CYCLE: ICD-10-CM

## 2024-09-14 LAB
ALBUMIN SERPL-MCNC: 3.8 G/DL (ref 3.3–5.5)
ALP SERPL-CCNC: 113 U/L (ref 42–141)
B-HCG UR QL: NEGATIVE
BILIRUB SERPL-MCNC: 0.6 MG/DL (ref 0.2–1.6)
BUN SERPL-MCNC: 10 MG/DL (ref 7–22)
CALCIUM SERPL-MCNC: 9.6 MG/DL (ref 8–10.3)
CHLORIDE SERPL-SCNC: 107 MMOL/L (ref 98–108)
CREAT SERPL-MCNC: 0.8 MG/DL (ref 0.6–1.2)
CTP QC/QA: YES
GLUCOSE SERPL-MCNC: 86 MG/DL (ref 73–118)
HCT, POC: NORMAL
HGB, POC: NORMAL (ref 14–18)
MCH, POC: NORMAL
MCHC, POC: NORMAL
MCV, POC: NORMAL
MPV, POC: NORMAL
POC ALT (SGPT): 15 U/L (ref 10–47)
POC AST (SGOT): 22 U/L (ref 11–38)
POC PLATELET COUNT: NORMAL
POC PTINR: 1.2 (ref 0.9–1.2)
POC PTWBT: 14.8 SEC (ref 9.7–14.3)
POC TCO2: 32 MMOL/L (ref 18–33)
POTASSIUM BLD-SCNC: 3.8 MMOL/L (ref 3.6–5.1)
PROTEIN, POC: 7.7 G/DL (ref 6.4–8.1)
RBC, POC: NORMAL
RDW, POC: NORMAL
SAMPLE: ABNORMAL
SODIUM BLD-SCNC: 139 MMOL/L (ref 128–145)
WBC, POC: NORMAL

## 2024-09-14 PROCEDURE — 85025 COMPLETE CBC W/AUTO DIFF WBC: CPT | Mod: ER

## 2024-09-14 PROCEDURE — 80053 COMPREHEN METABOLIC PANEL: CPT | Mod: ER

## 2024-09-14 PROCEDURE — 25000003 PHARM REV CODE 250: Mod: ER

## 2024-09-14 PROCEDURE — 81514 NFCT DS BV&VAGINITIS DNA ALG: CPT

## 2024-09-14 PROCEDURE — 96360 HYDRATION IV INFUSION INIT: CPT | Mod: ER

## 2024-09-14 PROCEDURE — 81025 URINE PREGNANCY TEST: CPT | Mod: ER

## 2024-09-14 PROCEDURE — 99284 EMERGENCY DEPT VISIT MOD MDM: CPT | Mod: 25,ER

## 2024-09-14 PROCEDURE — 87491 CHLMYD TRACH DNA AMP PROBE: CPT

## 2024-09-14 PROCEDURE — 87591 N.GONORRHOEAE DNA AMP PROB: CPT

## 2024-09-14 RX ORDER — ACETAMINOPHEN 500 MG
500 TABLET ORAL EVERY 6 HOURS PRN
Qty: 30 TABLET | Refills: 0 | Status: SHIPPED | OUTPATIENT
Start: 2024-09-14

## 2024-09-14 RX ORDER — ACETAMINOPHEN 500 MG
1000 TABLET ORAL
Status: COMPLETED | OUTPATIENT
Start: 2024-09-14 | End: 2024-09-14

## 2024-09-14 RX ORDER — NAPROXEN 500 MG/1
500 TABLET ORAL 2 TIMES DAILY
Qty: 30 TABLET | Refills: 0 | Status: SHIPPED | OUTPATIENT
Start: 2024-09-14

## 2024-09-14 RX ADMIN — ACETAMINOPHEN 1000 MG: 500 TABLET ORAL at 04:09

## 2024-09-14 RX ADMIN — SODIUM CHLORIDE 1000 ML: 9 INJECTION, SOLUTION INTRAVENOUS at 04:09

## 2024-09-14 NOTE — ED PROVIDER NOTES
Encounter Date: 9/14/2024    SCRIBE #1 NOTE: I, Loida Li, am scribing for, and in the presence of,  Jim Boudreaux PA-C.       History     Chief Complaint   Patient presents with    Vaginal Bleeding     Pt reports vaginal bleeding x28 days with pelvic pain unrelieved by ibuprofen. Pt was seen by OB/GYN for bleeding last week and was given meds to help relieve bleeding without any relief. Patient denies dizziness. Weakness noted.      24 yo F w/ PMHx of hashimoto's, PCOS, presenting to the ED for persistent vaginal bleeding x 28 days. She was evaluated by her OBGYN on 8/30/24 and prescribed provera which she reports has not alleviated her bleeding. She then started experiencing lower abdominal pain 2 days ago, prompting her here today for evaluation. No other exacerbating or alleviating factors.     The history is provided by the patient.     Review of patient's allergies indicates:  No Known Allergies  Past Medical History:   Diagnosis Date    Hashimoto's thyroiditis 12/5/2023    Obesity     PCOS (polycystic ovarian syndrome)      No past surgical history on file.  Family History   Problem Relation Name Age of Onset    Cancer Neg Hx       Social History     Tobacco Use    Smoking status: Never    Smokeless tobacco: Never   Substance Use Topics    Alcohol use: Yes     Comment: OCC    Drug use: Not Currently     Frequency: 1.0 times per week     Review of Systems   Constitutional:  Negative for chills, diaphoresis, fatigue and fever.   HENT:  Negative for congestion, rhinorrhea and sore throat.    Eyes:  Negative for redness and visual disturbance.   Respiratory:  Negative for cough and shortness of breath.    Cardiovascular:  Negative for chest pain, palpitations and leg swelling.   Gastrointestinal:  Negative for abdominal pain, diarrhea, nausea and vomiting.   Genitourinary:  Positive for pelvic pain and vaginal bleeding. Negative for decreased urine volume, difficulty urinating, dysuria, flank pain, frequency,  genital sores, hematuria, menstrual problem, urgency, vaginal discharge and vaginal pain.   Musculoskeletal:  Negative for arthralgias, back pain, myalgias, neck pain and neck stiffness.   Skin:  Negative for rash.   Neurological:  Negative for dizziness, weakness, light-headedness and headaches.   Hematological:  Does not bruise/bleed easily.       Physical Exam     Initial Vitals [09/14/24 1403]   BP Pulse Resp Temp SpO2   129/85 94 14 98.2 °F (36.8 °C) 98 %      MAP       --         Physical Exam    Nursing note and vitals reviewed.  Constitutional: She appears well-developed and well-nourished. She is not diaphoretic. No distress.   HENT:   Head: Normocephalic and atraumatic.   Right Ear: External ear normal.   Left Ear: External ear normal.   Nose: Nose normal.   Mouth/Throat: Oropharynx is clear and moist.   Eyes: Conjunctivae and EOM are normal. Pupils are equal, round, and reactive to light. Right eye exhibits no discharge. Left eye exhibits no discharge.   Neck: Neck supple.   Normal range of motion.   Full passive range of motion without pain.     Cardiovascular:  Normal rate, regular rhythm, normal heart sounds and normal pulses.     Exam reveals no distant heart sounds and no friction rub.       Pulmonary/Chest: Effort normal and breath sounds normal. No respiratory distress.   Abdominal: Abdomen is soft. Bowel sounds are normal. She exhibits no distension, no pulsatile midline mass and no mass. There is no splenomegaly or hepatomegaly. There is abdominal tenderness (suprapubic).   No right CVA tenderness.  No left CVA tenderness. There is no rebound and no guarding.   Genitourinary:    Uterus normal.      Pelvic exam was performed with patient supine.   No labial fusion. There is no rash, tenderness, lesion or injury on the right labia. There is no rash, tenderness, lesion or injury on the left labia. Cervix exhibits no motion tenderness, no discharge and no friability. Right adnexum displays no mass,  no tenderness and no fullness. Left adnexum displays no mass, no tenderness and no fullness.    Vaginal bleeding present.      No vaginal discharge, erythema or tenderness.   There is bleeding in the vagina. No erythema or tenderness in the vagina.    No foreign body in the vagina.      No signs of injury in the vagina.      Genitourinary Comments: Chaperoned by Sweetie Carson RN  Blood in vaginal vault, no masses, no friability, no CMT, no tenderness. No dicharge     Musculoskeletal:         General: Normal range of motion.      Right shoulder: Normal.      Left shoulder: Normal.      Right elbow: Normal.      Left elbow: Normal.      Right wrist: Normal.      Left wrist: Normal.      Right hand: Normal.      Left hand: Normal.      Cervical back: Normal, full passive range of motion without pain, normal range of motion and neck supple.      Thoracic back: Normal.      Lumbar back: Normal.      Right hip: Normal.      Left hip: Normal.      Right knee: Normal.      Left knee: Normal.      Right lower leg: Normal.      Left lower leg: Normal.      Right ankle: Normal.      Left ankle: Normal.      Right foot: Normal.      Left foot: Normal.     Neurological: She is alert and oriented to person, place, and time. She has normal strength. No cranial nerve deficit or sensory deficit. Gait normal.   Skin: Skin is warm and dry. Capillary refill takes less than 2 seconds. No bruising, no ecchymosis and no rash noted. No pallor.   Psychiatric: She has a normal mood and affect. Her speech is normal and behavior is normal. Thought content normal.         ED Course   Procedures  Labs Reviewed   ISTAT PROCEDURE - Abnormal       Result Value    POC PTWBT 14.8 (*)     POC PTINR 1.2      Sample unknown     C. TRACHOMATIS/N. GONORRHOEAE BY AMP DNA   VAGINOSIS SCREEN BY DNA PROBE   POCT URINE PREGNANCY    POC Preg Test, Ur Negative       Acceptable Yes     POCT CBC    Hematocrit        Hemoglobin        RBC         WBC        MCV        MCH, POC        MCHC        RDW-CV        Platelet Count, POC        MPV       POCT URINALYSIS W/O SCOPE   POCT CMP   POCT PROTIME-INR   POCT CMP    Albumin, POC 3.8      Alkaline Phosphatase,       ALT (SGPT), POC 15      AST (SGOT), POC 22      POC BUN 10      Calcium, POC 9.6      POC Chloride 107      POC Creatinine 0.8      POC Glucose 86      POC Potassium 3.8      POC Sodium 139      Bilirubin, POC 0.6      POC TCO2 32      Protein, POC 7.7            Imaging Results              US Pelvis Comp with Transvag NON-OB (xpd (Final result)  Result time 09/14/24 17:06:34      Final result by Andres Luna MD (09/14/24 17:06:34)                   Impression:      Heterogeneous material within the lower uterine segment approximating the cervix suggesting blood products.  No abnormal endometrial hyperemia.  Correlation with phase of menstrual cycle recommended.      Electronically signed by: Andres Luna MD  Date:    09/14/2024  Time:    17:06               Narrative:    EXAMINATION:  US PELVIS COMP WITH TRANSVAG NON-OB (XPD)    CLINICAL HISTORY:  Dysmenorrhea with pelvic pain;    TECHNIQUE:  Transabdominal sonography of the pelvis was performed, followed by transvaginal sonography to better evaluate the uterus and ovaries.    COMPARISON:  08/10/2022    FINDINGS:  The uterus measures approximately 7.8 x 4.8 x 4.5 cm.  The endometrial stripe is not thickened measuring up to 1 cm.  No abnormal endometrial hyperemia.  The cervix is unremarkable.  There is heterogeneous material within the lower uterine segment approximating the cervix suggesting blood products.    The left ovary measures approximately 2.3 x 2.1 x 2.8 cm.  The left ovary measures approximately 3.0 x 2.1 x 2.2 cm.  Arterial and venous flow is documented to the ovaries bilaterally.  No significant free fluid in the pelvis.                                       Medications   sodium chloride 0.9% bolus 1,000 mL 1,000  mL (1,000 mLs Intravenous New Bag 9/14/24 1636)   acetaminophen tablet 1,000 mg (1,000 mg Oral Given 9/14/24 1637)     Medical Decision Making  22 yo F w/ PMHx of hashimoto's, PCOS, presenting to the ED for persistent vaginal bleeding x 28 days. She was evaluated by her OBGYN on 8/30/24 and prescribed provera which she reports has not alleviated her bleeding. She then started experiencing lower abdominal pain 2 days ago, prompting her here today for evaluation. No other exacerbating or alleviating factors.     Patient's chart and medical history reviewed.  Patient's vitals reviewed.  They are afebrile, no respiratory distress, nontoxic-appearing in the ED.  On exam, patient has suprapubic tenderness.  exam with blood in vaginal vault with no other abnormalities.    Differential diagnosis include but are not limited to: PCOS (patient has Hx of PCOS), fibroids, cysts, vaginitis, PID, P.A.L.M C.O.E.I.N.  Patient denies discharge, no CMT or masses, alina/chlamydia and vaginosis screening obtained but will take 2-3 days to result and informed patient will contact her if positive. Patient AUB has already been evaluated by her OBGYN and is not controlled with Provera. Will obtain US imaging for further evaluation of causes. More per ED course. Mild elevation in patient PTT only with no personal or family Hx of bleeding disordered, discussed with patient to follow up with her PCP for further evaluation for possible causes and repeat testing. Also this was performed on a POCT test which may cause false elevations. Patient does have mild vaginal bleeding, which has been going on for the last 28 days and is under investigation by her OBGYN. There is no indication for blood transfusion per her H/H with no emergent lab findings.  With PCOS in patient Hx could be likely cause. CBC ordered for evaluation of anemia due to vaginal bleeding. If no emergent findings on patient workup plan to dc home with naproxen and tylenol for pain  and follow up with her OBGYN for re-evaluation in 1-2 days.  Instructed to follow up with PCP and OBGYN in 1-2 days.    I discussed with the patient/family the diagnosis, treatment plan, indications for return to the emergency department, and for expected follow-up. The patient/family verbalized an understanding. The patient/family is asked if there are any questions or concerns. We discuss the case, until all issues are addressed to the patient/family's satisfaction. Patient/family understands and is agreeable to the plan.     DISCLAIMER: This note was prepared with MModal voice recognition transcription software. Garbled syntax, mangled pronouns, and other bizarre constructions may be attributed to that software system.      Amount and/or Complexity of Data Reviewed  Labs: ordered. Decision-making details documented in ED Course.  Radiology: ordered and independent interpretation performed. Decision-making details documented in ED Course.            Scribe Attestation:   Scribe #1: I performed the above scribed service and the documentation accurately describes the services I performed. I attest to the accuracy of the note.        ED Course as of 09/14/24 1733   Sat Sep 14, 2024   1624 H&H 11.5/34.5 [AF]   1624 BP: 129/85 [AF]   1707 hCG Qualitative, Urine: Negative [AF]   1708 POC PTINR: 1.2 [AF]   1712 Ultrasound results with evidence of blood products and cervix and canal no other acute abnormalities. [AF]      ED Course User Index  [AF] Jim Boudreaux PA-C I, Alain David Flexer, PA-C, personally performed the services described in this documentation. All medical record entries made by the scribe were at my direction and in my presence. I have reviewed the chart and agree that the record reflects my personal performance and is accurate and complete.      DISCLAIMER: This note was prepared with MModal voice recognition transcription software. Garbled syntax, mangled pronouns, and other  bizarre constructions may be attributed to that software system.      Clinical Impression:  Final diagnoses:  [N93.9] Abnormal uterine bleeding (AUB) (Primary)  [N92.0] Menorrhagia with regular cycle  [E28.2] PCOS (polycystic ovarian syndrome)          ED Disposition Condition    Discharge Stable          ED Prescriptions       Medication Sig Dispense Start Date End Date Auth. Provider    naproxen (NAPROSYN) 500 MG tablet Take 1 tablet (500 mg total) by mouth 2 (two) times daily. 30 tablet 9/14/2024 -- Jim Boudreaux PA-C    acetaminophen (TYLENOL) 500 MG tablet Take 1 tablet (500 mg total) by mouth every 6 (six) hours as needed for Pain. 30 tablet 9/14/2024 -- Jim Boudreaux PA-C          Follow-up Information       Follow up With Specialties Details Why Contact [facility] -  Schedule an appointment as soon as possible for a visit in 1 day for follow up if you do not currently have a  [address] [place] [address]  [phone]      Your primary care physician  Schedule an appointment as soon as possible for a visit in 1 day for follow up     Lg Man III, MD Obstetrics and Gynecology Schedule an appointment as soon as possible for a visit in 1 day for follow up [address] [facility] [address]  [facility] [address]  [phone]      [facility] ED Emergency Medicine Go to  If symptoms worsen, As needed [address] [facility] [address] [phone]             iJm Boudreaux PA-C  09/14/24 4862

## 2024-09-14 NOTE — DISCHARGE INSTRUCTIONS
Thank you for coming to our Emergency Department today. It is important to remember that some problems or medical conditions are difficult to diagnose and may not be found or addressed during your Emergency Department visit.  These conditions often start with non-specific symptoms and can only be diagnosed on follow up visits with your primary care physician or specialist when the symptoms continue or change. Please remember that all medical conditions can change, and we cannot predict how you will be feeling tomorrow or the next day. Return to the ER with any questions/concerns, new/concerning symptoms including fever, chest pain, shortness of breath, loss of consciousness, dizziness, weakness, worsening symptoms, failure to improve, or any other concerns. Also, please follow up with your Primary Care Physician and/or Pediatrician in the next 1-2 days to review your ED visit in entirety and for re-evaluation.   Be sure to follow up with your primary care doctor and review all labs/imaging/tests that were performed during your ER visit with them. It is very common for us to identify non-emergent incidental findings which must be followed up with your primary care physician.  Some labs/imaging/tests may be outside of the normal range, and require non-emergent follow-up and/or further investigation/treatment/procedures/testing to help diagnose/exclude/prevent complications or other potentially serious medical conditions. Some abnormalities may not have been discussed or addressed during your ER visit. Some lab results may not return during your ER visit but can be accessible by downloading the free [facility] allan or by visiting [email or web address] . It is important for you to review all labs/imaging/tests which are outside of the normal range with your physician.  An ER visit does not replace a primary care visit, and many screening tests or follow-up tests cannot be ordered by an ER doctor or performed by  the ER. Some tests may even require pre-approval.  If you do not have a primary care doctor, you may contact the one listed on your discharge paperwork or you may also call the [facility] Appointment Desk at [phone] , or [address] [place] [address] at  [phone] to schedule an appointment, or establish care with a primary care doctor or even a specialist and to obtain information about local resources. It is important to your health that you have a primary care doctor.  Please take all medications as directed. We have done our best to select a medication for you that will treat your condition however, all medications may potentially have side-effects and it is impossible to predict which medications may give you side-effects or what those side-effects (if any) those medications may give you.  If you feel that you are having a negative effect or side-effect of any medication you should stop taking those medications immediately and seek medical attention. If you feel that you are having a life-threatening reaction call 911.  Do not drive, swim, climb to height, take a bath, operate heavy machinery, drink alcohol or take potentially sedating medications, sign any legal documents or make any important decisions for 24 hours if you have received any pain medications, sedatives or mood altering drugs during your ER visit or within 24 hours of taking them if they have been prescribed to you.   You can find additional resources for Dentists, hearing aids, durable medical equipment, low cost pharmacies and other resources at [email or web address]  Patient agrees with this plan. Discussed with her strict return precautions, they verbalized understanding. Patient is stable for discharge.   § Please take all medication as prescribed.

## 2024-09-16 LAB
BACTERIAL VAGINOSIS DNA: POSITIVE
C TRACH DNA SPEC QL NAA+PROBE: NOT DETECTED
CANDIDA GLABRATA DNA: NEGATIVE
CANDIDA KRUSEI DNA: NEGATIVE
CANDIDA RRNA VAG QL PROBE: NEGATIVE
N GONORRHOEA DNA SPEC QL NAA+PROBE: NOT DETECTED
T VAGINALIS RRNA GENITAL QL PROBE: NEGATIVE

## 2024-09-16 RX ORDER — METRONIDAZOLE 500 MG/1
500 TABLET ORAL EVERY 12 HOURS
Qty: 14 TABLET | Refills: 0 | Status: SHIPPED | OUTPATIENT
Start: 2024-09-16

## 2025-04-08 ENCOUNTER — OFFICE VISIT (OUTPATIENT)
Dept: OBSTETRICS AND GYNECOLOGY | Facility: CLINIC | Age: 25
End: 2025-04-08
Payer: COMMERCIAL

## 2025-04-08 DIAGNOSIS — N93.9 ABNORMAL UTERINE BLEEDING (AUB): Primary | ICD-10-CM

## 2025-04-08 PROCEDURE — 99213 OFFICE O/P EST LOW 20 MIN: CPT | Mod: S$GLB,,,

## 2025-04-08 PROCEDURE — 99999 PR PBB SHADOW E&M-EST. PATIENT-LVL III: CPT | Mod: PBBFAC,,,

## 2025-04-08 RX ORDER — ESTRADIOL 2 MG/1
2 TABLET ORAL DAILY
Qty: 10 TABLET | Refills: 4 | Status: SHIPPED | OUTPATIENT
Start: 2025-04-08 | End: 2025-04-19

## 2025-04-08 NOTE — PROGRESS NOTES
Subjective     Patient ID: Megan Gar is a 24 y.o. female.    Chief Complaint:  Menstrual Problem      History of Present Illness  HPI  Ms. Megan Gar is a 23 yo  , with a history of hypothyroidism, and PCOS, presents due to a heavy and prolonged menstrual cycle.  She reports a cycle that lasted from 2025 to 2025.  Reports taking Provera for 7 days which caused bleeding to stop for 1 week.  Reports cycles restarted on 2025 until today.  Patient reports that she does not take Synthroid as prescribed.  She has not had a follow up with Endocrinology in a while.  She also reports recent weight changes.  Patient states she started Mounjaro for for a few months last summer and remembers losing about 25 lb.  Reports medication got too expensive and she subsequently gained that weight back.  She recently restarted Mounjaro last month.  Reports losing about 14 lb.    [facility] 2024  22 yo G0 presents to discuss AUB  Periods had been more regular this winter without any medication but at the start of summer periods stopped for May-July until  and has been going on since then  Started taking mounjaro but has gotten too expensive and has not continued. Still hovering around the same weight.   Was diagnosed with Hashimoto's and started on synthroid but still feels fatigued/cold. Has not had labs ordered by endocrine drawn yet, plans to do the non-fasting/timed labs today  Does not desire to get on birth control at this time, just would like to have more regulated bleeding.   Has an appointment with bariatrics soon but will be changing insurance and may need to cancel     GYN & OB History  No LMP recorded.   Date of Last Pap: 2022    OB History    Para Term  AB Living   0 0 0 0 0 0   SAB IAB Ectopic Multiple Live Births   0 0 0 0 0       Review of Systems  Review of Systems   Constitutional:  Negative for activity change and appetite change.   Respiratory:   Negative for shortness of breath.    Cardiovascular:  Negative for chest pain.   Gastrointestinal:  Negative for abdominal pain, diarrhea and nausea.   Genitourinary:  Positive for menorrhagia and menstrual problem. Negative for bladder incontinence, decreased libido, dysmenorrhea, dyspareunia, dysuria, flank pain, frequency, genital sores, hematuria, hot flashes, pelvic pain, urgency, vaginal bleeding, vaginal discharge, vaginal pain, urinary incontinence, postcoital bleeding, postmenopausal bleeding, vaginal dryness and vaginal odor.   Integumentary:  Negative for breast tenderness.   Neurological:  Negative for headaches.   Breast: Negative for breast self exam and tenderness         Objective   Physical Exam:   Constitutional: She is oriented to person, place, and time. She appears well-developed and well-nourished.    HENT:   Head: Normocephalic.      Cardiovascular:       Exam reveals no clubbing.        Pulmonary/Chest: Effort normal and breath sounds normal. Right breast exhibits no nipple discharge, no skin change, no tenderness, no bleeding and no swelling. Left breast exhibits no nipple discharge, no skin change, no tenderness, no bleeding and no swelling. Breasts are symmetrical.        Abdominal: Soft. There is no abdominal tenderness. Hernia confirmed negative in the right inguinal area and confirmed negative in the left inguinal area.     Genitourinary:    Inguinal canal, uterus, right adnexa, left adnexa and rectum normal.   Rectum:      No tenderness.      Pelvic exam was performed with patient supine.   The external female genitalia was normal.   No external genitalia lesions identified,Genitalia hair distrobution normal .     Labial bartholins normal.Cervix is normal. There is bleeding in the vagina. No vaginal discharge or tenderness in the vagina.    No signs of injury in the vagina.   Vagina was moist.Cervix exhibits no discharge and no tenderness. Uterus is not tender. Normal urethral  meatus.Urethra findings: no tendernessBladder findings: no bladder tenderness          Musculoskeletal: Normal range of motion and moves all extremeties.      Lymphadenopathy: No inguinal adenopathy noted on the right or left side.    Neurological: She is alert and oriented to person, place, and time.    Skin: Skin is warm. Nails show no clubbing.    Psychiatric: She has a normal mood and affect. Her behavior is normal. Judgment and thought content normal.            Assessment and Plan     1. Abnormal uterine bleeding (AUB)           Plan:  1. Abnormal uterine bleeding (AUB) (Primary)  - C. trachomatis/N. gonorrhoeae by AMP DNA; Future  - Vaginosis Screen by DNA Probe  - estradioL (ESTRACE) 2 MG tablet; Take 1 tablet (2 mg total) by mouth once daily. for 10 days  Dispense: 10 tablet; Refill: 4  - Ambulatory referral/consult to Endocrinology; Future  - C. trachomatis/N. gonorrhoeae by AMP DNA     -patient instructed to follow up with Endocrinology- referral in  - endocrine labs ordered- patient still needs  -educated on importance of taking Synthroid daily as prescribed  -made aware of the effects of weight changes and periods       Follow up MARIA GUADALUPE Hernandez-BC

## 2025-04-16 ENCOUNTER — RESULTS FOLLOW-UP (OUTPATIENT)
Dept: OBSTETRICS AND GYNECOLOGY | Facility: CLINIC | Age: 25
End: 2025-04-16

## 2025-04-16 ENCOUNTER — PATIENT MESSAGE (OUTPATIENT)
Dept: OBSTETRICS AND GYNECOLOGY | Facility: CLINIC | Age: 25
End: 2025-04-16
Payer: COMMERCIAL

## 2025-04-16 DIAGNOSIS — B96.89 BV (BACTERIAL VAGINOSIS): Primary | ICD-10-CM

## 2025-04-16 DIAGNOSIS — N76.0 BV (BACTERIAL VAGINOSIS): Primary | ICD-10-CM

## 2025-04-16 RX ORDER — METRONIDAZOLE 500 MG/1
500 TABLET ORAL 2 TIMES DAILY
Qty: 14 TABLET | Refills: 0 | Status: SHIPPED | OUTPATIENT
Start: 2025-04-16 | End: 2025-04-23

## 2025-05-31 ENCOUNTER — PATIENT MESSAGE (OUTPATIENT)
Dept: OBSTETRICS AND GYNECOLOGY | Facility: CLINIC | Age: 25
End: 2025-05-31
Payer: COMMERCIAL

## 2025-06-10 ENCOUNTER — PATIENT MESSAGE (OUTPATIENT)
Dept: OBSTETRICS AND GYNECOLOGY | Facility: CLINIC | Age: 25
End: 2025-06-10
Payer: COMMERCIAL

## 2025-06-10 DIAGNOSIS — Z79.2 PROPHYLACTIC ANTIBIOTIC: Primary | ICD-10-CM

## 2025-06-11 RX ORDER — FLUCONAZOLE 150 MG/1
150 TABLET ORAL
Qty: 2 TABLET | Refills: 0 | Status: SHIPPED | OUTPATIENT
Start: 2025-06-11

## 2025-07-02 ENCOUNTER — PATIENT MESSAGE (OUTPATIENT)
Dept: OBSTETRICS AND GYNECOLOGY | Facility: CLINIC | Age: 25
End: 2025-07-02
Payer: COMMERCIAL